# Patient Record
Sex: FEMALE | Race: WHITE | Employment: UNEMPLOYED | URBAN - METROPOLITAN AREA
[De-identification: names, ages, dates, MRNs, and addresses within clinical notes are randomized per-mention and may not be internally consistent; named-entity substitution may affect disease eponyms.]

---

## 2017-02-03 ENCOUNTER — LAB ENCOUNTER (OUTPATIENT)
Dept: LAB | Age: 52
End: 2017-02-03
Attending: OBSTETRICS & GYNECOLOGY
Payer: COMMERCIAL

## 2017-02-03 DIAGNOSIS — Z01.419 WELL WOMAN EXAM: ICD-10-CM

## 2017-02-03 PROCEDURE — 88175 CYTOPATH C/V AUTO FLUID REDO: CPT

## 2017-02-03 PROCEDURE — 87624 HPV HI-RISK TYP POOLED RSLT: CPT

## 2017-02-06 ENCOUNTER — HOSPITAL ENCOUNTER (OUTPATIENT)
Dept: MAMMOGRAPHY | Age: 52
Discharge: HOME | End: 2017-02-06
Attending: OBSTETRICS & GYNECOLOGY
Payer: COMMERCIAL

## 2017-02-06 DIAGNOSIS — Z12.31 VISIT FOR SCREENING MAMMOGRAM: ICD-10-CM

## 2017-02-06 LAB — HPV I/H RISK 1 DNA SPEC QL NAA+PROBE: NEGATIVE

## 2017-02-06 PROCEDURE — 77067 SCR MAMMO BI INCL CAD: CPT

## 2017-07-20 PROBLEM — H69.82 EUSTACHIAN TUBE DYSFUNCTION, LEFT: Status: ACTIVE | Noted: 2017-07-20

## 2017-07-20 PROBLEM — R09.82 POST-NASAL DRAINAGE: Status: ACTIVE | Noted: 2017-07-20

## 2017-07-20 PROBLEM — H69.92 EUSTACHIAN TUBE DYSFUNCTION, LEFT: Status: ACTIVE | Noted: 2017-07-20

## 2018-01-24 ENCOUNTER — OFFICE VISIT (OUTPATIENT)
Dept: OBGYN CLINIC | Facility: CLINIC | Age: 53
End: 2018-01-24

## 2018-01-24 ENCOUNTER — TELEPHONE (OUTPATIENT)
Dept: FAMILY MEDICINE CLINIC | Facility: CLINIC | Age: 53
End: 2018-01-24

## 2018-01-24 VITALS
SYSTOLIC BLOOD PRESSURE: 120 MMHG | HEART RATE: 72 BPM | HEIGHT: 61 IN | DIASTOLIC BLOOD PRESSURE: 66 MMHG | WEIGHT: 136 LBS | BODY MASS INDEX: 25.68 KG/M2

## 2018-01-24 DIAGNOSIS — Z01.419 WELL WOMAN EXAM WITH ROUTINE GYNECOLOGICAL EXAM: Primary | ICD-10-CM

## 2018-01-24 DIAGNOSIS — Z12.39 SCREENING BREAST EXAMINATION: ICD-10-CM

## 2018-01-24 PROCEDURE — 99396 PREV VISIT EST AGE 40-64: CPT | Performed by: OBSTETRICS & GYNECOLOGY

## 2018-01-24 RX ORDER — ESTRADIOL 1 MG/1
1 TABLET ORAL DAILY
Qty: 90 TABLET | Refills: 4 | Status: SHIPPED | OUTPATIENT
Start: 2018-01-24 | End: 2019-01-31

## 2018-01-24 NOTE — PROGRESS NOTES
Jeanna Sandoval is a 45year old female  No LMP recorded. Patient is not currently having periods (Reason: Menopause). Patient presents with:  Wellness Visit  . She has no complaints. Denies postmenopausal bleeding, urinary or sexual issues.   Compla or equivalent per week    Drug use: No    Sexual activity: Not on file     Other Topics Concern    Caffeine Concern Yes    Comment: TEA    Exercise Yes    Comment: active    Seat Belt Yes    Special Diet No    Stress Concern No    Weight Concern No     Soc bruises  Extremities: no edema, no cyanosis  Psychiatric:  Oriented to time, place, person and situation.  Appropriate mood and affect    Pelvic Exam:  External Genitalia: normal appearance, hair distribution, and no lesions  Urethral Meatus:  normal in siz

## 2018-01-25 ENCOUNTER — OFFICE VISIT (OUTPATIENT)
Dept: FAMILY MEDICINE CLINIC | Facility: CLINIC | Age: 53
End: 2018-01-25

## 2018-01-25 ENCOUNTER — LAB ENCOUNTER (OUTPATIENT)
Dept: LAB | Age: 53
End: 2018-01-25
Attending: FAMILY MEDICINE
Payer: COMMERCIAL

## 2018-01-25 VITALS
SYSTOLIC BLOOD PRESSURE: 106 MMHG | RESPIRATION RATE: 16 BRPM | HEIGHT: 60.8 IN | TEMPERATURE: 98 F | DIASTOLIC BLOOD PRESSURE: 74 MMHG | WEIGHT: 133.38 LBS | BODY MASS INDEX: 25.51 KG/M2 | HEART RATE: 76 BPM

## 2018-01-25 DIAGNOSIS — Z13.220 LIPID SCREENING: ICD-10-CM

## 2018-01-25 DIAGNOSIS — M79.89 FOOT SWELLING: ICD-10-CM

## 2018-01-25 DIAGNOSIS — R53.83 OTHER FATIGUE: ICD-10-CM

## 2018-01-25 DIAGNOSIS — M25.549 PAIN IN MULTIPLE FINGER JOINTS: ICD-10-CM

## 2018-01-25 DIAGNOSIS — Z00.00 ANNUAL PHYSICAL EXAM: ICD-10-CM

## 2018-01-25 DIAGNOSIS — Z83.49 FAMILY HISTORY OF THYROID DISEASE: ICD-10-CM

## 2018-01-25 DIAGNOSIS — Z00.00 ANNUAL PHYSICAL EXAM: Primary | ICD-10-CM

## 2018-01-25 PROBLEM — H69.82 EUSTACHIAN TUBE DYSFUNCTION, LEFT: Status: RESOLVED | Noted: 2017-07-20 | Resolved: 2018-01-25

## 2018-01-25 PROBLEM — R09.82 POST-NASAL DRAINAGE: Status: RESOLVED | Noted: 2017-07-20 | Resolved: 2018-01-25

## 2018-01-25 PROBLEM — H69.92 EUSTACHIAN TUBE DYSFUNCTION, LEFT: Status: RESOLVED | Noted: 2017-07-20 | Resolved: 2018-01-25

## 2018-01-25 LAB
ALBUMIN SERPL-MCNC: 4.2 G/DL (ref 3.5–4.8)
ALP LIVER SERPL-CCNC: 84 U/L (ref 41–108)
ALT SERPL-CCNC: 26 U/L (ref 14–54)
AST SERPL-CCNC: 21 U/L (ref 15–41)
BASOPHILS # BLD AUTO: 0.03 X10(3) UL (ref 0–0.1)
BASOPHILS NFR BLD AUTO: 0.6 %
BILIRUB SERPL-MCNC: 0.4 MG/DL (ref 0.1–2)
BUN BLD-MCNC: 14 MG/DL (ref 8–20)
CALCIUM BLD-MCNC: 9.3 MG/DL (ref 8.3–10.3)
CHLORIDE: 104 MMOL/L (ref 101–111)
CHOLEST SMN-MCNC: 230 MG/DL (ref ?–200)
CO2: 30 MMOL/L (ref 22–32)
CREAT BLD-MCNC: 0.84 MG/DL (ref 0.55–1.02)
EOSINOPHIL # BLD AUTO: 0.12 X10(3) UL (ref 0–0.3)
EOSINOPHIL NFR BLD AUTO: 2.5 %
ERYTHROCYTE [DISTWIDTH] IN BLOOD BY AUTOMATED COUNT: 13.5 % (ref 11.5–16)
GLUCOSE BLD-MCNC: 79 MG/DL (ref 70–99)
HCT VFR BLD AUTO: 41.1 % (ref 34–50)
HDLC SERPL-MCNC: 127 MG/DL (ref 45–?)
HDLC SERPL: 1.81 {RATIO} (ref ?–4.44)
HGB BLD-MCNC: 13.3 G/DL (ref 12–16)
IMMATURE GRANULOCYTE COUNT: 0.01 X10(3) UL (ref 0–1)
IMMATURE GRANULOCYTE RATIO %: 0.2 %
LDLC SERPL CALC-MCNC: 91 MG/DL (ref ?–130)
LYMPHOCYTES # BLD AUTO: 0.84 X10(3) UL (ref 0.9–4)
LYMPHOCYTES NFR BLD AUTO: 17.8 %
M PROTEIN MFR SERPL ELPH: 8.2 G/DL (ref 6.1–8.3)
MCH RBC QN AUTO: 30.3 PG (ref 27–33.2)
MCHC RBC AUTO-ENTMCNC: 32.4 G/DL (ref 31–37)
MCV RBC AUTO: 93.6 FL (ref 81–100)
MONOCYTES # BLD AUTO: 0.59 X10(3) UL (ref 0.1–0.6)
MONOCYTES NFR BLD AUTO: 12.5 %
NEUTROPHIL ABS PRELIM: 3.14 X10 (3) UL (ref 1.3–6.7)
NEUTROPHILS # BLD AUTO: 3.14 X10(3) UL (ref 1.3–6.7)
NEUTROPHILS NFR BLD AUTO: 66.4 %
NONHDLC SERPL-MCNC: 103 MG/DL (ref ?–130)
PLATELET # BLD AUTO: 316 10(3)UL (ref 150–450)
POTASSIUM SERPL-SCNC: 4.3 MMOL/L (ref 3.6–5.1)
RBC # BLD AUTO: 4.39 X10(6)UL (ref 3.8–5.1)
RED CELL DISTRIBUTION WIDTH-SD: 46.1 FL (ref 35.1–46.3)
SODIUM SERPL-SCNC: 140 MMOL/L (ref 136–144)
TRIGL SERPL-MCNC: 62 MG/DL (ref ?–150)
TSI SER-ACNC: 2.7 MIU/ML (ref 0.35–5.5)
VLDLC SERPL CALC-MCNC: 12 MG/DL (ref 5–40)
WBC # BLD AUTO: 4.7 X10(3) UL (ref 4–13)

## 2018-01-25 PROCEDURE — 86200 CCP ANTIBODY: CPT | Performed by: FAMILY MEDICINE

## 2018-01-25 PROCEDURE — 80050 GENERAL HEALTH PANEL: CPT | Performed by: FAMILY MEDICINE

## 2018-01-25 PROCEDURE — 86038 ANTINUCLEAR ANTIBODIES: CPT | Performed by: FAMILY MEDICINE

## 2018-01-25 PROCEDURE — 99386 PREV VISIT NEW AGE 40-64: CPT | Performed by: FAMILY MEDICINE

## 2018-01-25 PROCEDURE — 80061 LIPID PANEL: CPT | Performed by: FAMILY MEDICINE

## 2018-01-25 RX ORDER — TURMERIC ROOT EXTRACT 500 MG
1 TABLET ORAL DAILY
COMMUNITY

## 2018-01-25 RX ORDER — OCTISALATE, AVOBENZONE, HOMOSALATE, AND OCTOCRYLENE 29.4; 29.4; 49; 25.48 MG/ML; MG/ML; MG/ML; MG/ML
1 LOTION TOPICAL DAILY
COMMUNITY
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
## 2018-01-26 LAB — ANA SCREEN: NEGATIVE

## 2018-01-27 LAB — CYCLIC CITRULLINATED PEPTIDE: 4 UNITS

## 2018-01-31 PROBLEM — M84.375A STRESS REACTION OF LEFT FOOT, INITIAL ENCOUNTER: Status: ACTIVE | Noted: 2018-01-31

## 2018-02-08 ENCOUNTER — HOSPITAL ENCOUNTER (OUTPATIENT)
Dept: MAMMOGRAPHY | Age: 53
Discharge: HOME | End: 2018-02-08
Attending: OBSTETRICS & GYNECOLOGY
Payer: COMMERCIAL

## 2018-02-08 DIAGNOSIS — Z12.39 SCREENING BREAST EXAMINATION: ICD-10-CM

## 2018-02-08 PROCEDURE — 77067 SCR MAMMO BI INCL CAD: CPT | Performed by: OBSTETRICS & GYNECOLOGY

## 2018-04-24 ENCOUNTER — OFFICE VISIT (OUTPATIENT)
Dept: FAMILY MEDICINE CLINIC | Facility: CLINIC | Age: 53
End: 2018-04-24

## 2018-04-24 VITALS
BODY MASS INDEX: 24.87 KG/M2 | OXYGEN SATURATION: 99 % | TEMPERATURE: 98 F | HEIGHT: 60.5 IN | WEIGHT: 130 LBS | DIASTOLIC BLOOD PRESSURE: 60 MMHG | SYSTOLIC BLOOD PRESSURE: 102 MMHG | HEART RATE: 80 BPM | RESPIRATION RATE: 16 BRPM

## 2018-04-24 DIAGNOSIS — R05.3 CHRONIC COUGH: Primary | ICD-10-CM

## 2018-04-24 PROCEDURE — 99213 OFFICE O/P EST LOW 20 MIN: CPT | Performed by: FAMILY MEDICINE

## 2018-04-24 RX ORDER — ALBUTEROL SULFATE 90 UG/1
2 AEROSOL, METERED RESPIRATORY (INHALATION) EVERY 6 HOURS PRN
Qty: 1 INHALER | Refills: 0 | Status: SHIPPED | OUTPATIENT
Start: 2018-04-24 | End: 2021-03-24

## 2018-04-24 RX ORDER — AZITHROMYCIN 250 MG/1
TABLET, FILM COATED ORAL
Qty: 6 TABLET | Refills: 0 | Status: SHIPPED | OUTPATIENT
Start: 2018-04-24 | End: 2018-08-14 | Stop reason: ALTCHOICE

## 2018-04-24 NOTE — PROGRESS NOTES
Chief Complaint:  Patient presents with:  Cough: x5 weeks w/ productive cough, PND and fatigue. OTC meds w/ little relief. HPI:  This is a 45year old female patient presenting for Cough (x5 weeks w/ productive cough, PND and fatigue.  OTC meds w/ littl Disorder Mother      knee replacement   • Heart Disorder Mother      pacemaker   • Diabetes Paternal Grandmother    • No Known Problems Brother    • No Known Problems Brother    • No Known Problems Brother       Smoking status: Never Smoker movement  CV: HRRR, no murmurs, no peripheral edema   EXTREMITIES: warm and well perfused  PSYCH: pleasant and cooperative, no obvious depression or anxiety    ASSESSMENT AND PLAN:  Discussed the following assessment   Problem List Items Addressed This Freddie Bennett

## 2018-11-19 PROBLEM — R29.810 FACIAL WEAKNESS: Status: ACTIVE | Noted: 2018-11-19

## 2018-11-19 PROBLEM — G51.4 FACIAL TWITCHING: Status: ACTIVE | Noted: 2018-11-19

## 2018-12-13 PROCEDURE — 86618 LYME DISEASE ANTIBODY: CPT | Performed by: OTHER

## 2018-12-13 PROCEDURE — 84425 ASSAY OF VITAMIN B-1: CPT | Performed by: OTHER

## 2018-12-13 PROCEDURE — 83921 ORGANIC ACID SINGLE QUANT: CPT | Performed by: OTHER

## 2019-01-31 RX ORDER — ESTRADIOL 1 MG/1
TABLET ORAL
Qty: 30 TABLET | Refills: 0 | Status: SHIPPED | OUTPATIENT
Start: 2019-01-31 | End: 2019-02-19

## 2019-02-19 ENCOUNTER — OFFICE VISIT (OUTPATIENT)
Dept: OBGYN CLINIC | Facility: CLINIC | Age: 54
End: 2019-02-19
Payer: COMMERCIAL

## 2019-02-19 VITALS
HEART RATE: 55 BPM | WEIGHT: 128 LBS | DIASTOLIC BLOOD PRESSURE: 60 MMHG | SYSTOLIC BLOOD PRESSURE: 110 MMHG | BODY MASS INDEX: 23.55 KG/M2 | HEIGHT: 62 IN

## 2019-02-19 DIAGNOSIS — Z12.39 SCREENING FOR MALIGNANT NEOPLASM OF BREAST: ICD-10-CM

## 2019-02-19 DIAGNOSIS — Z12.4 PAPANICOLAOU SMEAR FOR CERVICAL CANCER SCREENING: ICD-10-CM

## 2019-02-19 DIAGNOSIS — Z01.419 WELL WOMAN EXAM WITH ROUTINE GYNECOLOGICAL EXAM: Primary | ICD-10-CM

## 2019-02-19 PROCEDURE — 88175 CYTOPATH C/V AUTO FLUID REDO: CPT | Performed by: OBSTETRICS & GYNECOLOGY

## 2019-02-19 PROCEDURE — 99396 PREV VISIT EST AGE 40-64: CPT | Performed by: OBSTETRICS & GYNECOLOGY

## 2019-02-19 PROCEDURE — 87624 HPV HI-RISK TYP POOLED RSLT: CPT | Performed by: OBSTETRICS & GYNECOLOGY

## 2019-02-19 RX ORDER — ESTRADIOL 1 MG/1
1 TABLET ORAL
Qty: 30 TABLET | Refills: 0 | Status: SHIPPED | OUTPATIENT
Start: 2019-02-19 | End: 2019-04-16

## 2019-02-19 NOTE — PROGRESS NOTES
Neil Márquez is a 52year old female  No LMP recorded. Patient is not currently having periods (Reason: Menopause). No chief complaint on file. Baueral Claude Has been on hormone replacement for a year. No hot flashes or night sweats or mood swings.   She Never Smoker      Smokeless tobacco: Never Used    Substance and Sexual Activity      Alcohol use: Yes        Types: 2 Glasses of wine, 2 Standard drinks or equivalent per week      Drug use: No      Sexual activity: Not on file    Lifestyle      Physical estradiol 1 MG Oral Tab, Take 1 tablet (1 mg total) by mouth once daily. , Disp: 30 tablet, Rfl: 0  •  ergocalciferol 64703 units Oral Cap, Take one 2x per week, Disp: 24 capsule, Rfl: 0  •  levofloxacin 500 MG Oral Tab, Take 1 tablet (500 mg total) by mout Extremities: no edema, no cyanosis  Psychiatric:  Oriented to time, place, person and situation.  Appropriate mood and affect    Pelvic Exam:  External Genitalia: normal appearance, hair distribution, and no lesions  Urethral Meatus:  normal in size, loca

## 2019-02-20 LAB — HPV I/H RISK 1 DNA SPEC QL NAA+PROBE: NEGATIVE

## 2019-02-21 LAB — LAST PAP RESULT: NORMAL

## 2019-02-27 RX ORDER — ESTRADIOL 1 MG/1
TABLET ORAL
Qty: 30 TABLET | Refills: 11 | Status: SHIPPED | OUTPATIENT
Start: 2019-02-27 | End: 2020-03-06

## 2019-03-21 ENCOUNTER — HOSPITAL ENCOUNTER (OUTPATIENT)
Dept: MAMMOGRAPHY | Age: 54
Discharge: HOME | End: 2019-03-21
Payer: COMMERCIAL

## 2019-03-21 DIAGNOSIS — Z12.39 SCREENING FOR MALIGNANT NEOPLASM OF BREAST: ICD-10-CM

## 2019-03-21 PROCEDURE — 77067 SCR MAMMO BI INCL CAD: CPT | Performed by: OBSTETRICS & GYNECOLOGY

## 2019-03-21 PROCEDURE — 77063 BREAST TOMOSYNTHESIS BI: CPT | Performed by: OBSTETRICS & GYNECOLOGY

## 2019-04-03 ENCOUNTER — HOSPITAL ENCOUNTER (OUTPATIENT)
Dept: MAMMOGRAPHY | Facility: HOSPITAL | Age: 54
Discharge: HOME | End: 2019-04-03
Attending: OBSTETRICS & GYNECOLOGY
Payer: COMMERCIAL

## 2019-04-03 DIAGNOSIS — R92.2 INCONCLUSIVE MAMMOGRAM: ICD-10-CM

## 2019-04-03 PROCEDURE — 77065 DX MAMMO INCL CAD UNI: CPT | Performed by: OBSTETRICS & GYNECOLOGY

## 2019-04-03 PROCEDURE — 76642 ULTRASOUND BREAST LIMITED: CPT | Performed by: OBSTETRICS & GYNECOLOGY

## 2019-04-03 PROCEDURE — 77061 BREAST TOMOSYNTHESIS UNI: CPT | Performed by: OBSTETRICS & GYNECOLOGY

## 2019-04-16 ENCOUNTER — OFFICE VISIT (OUTPATIENT)
Dept: FAMILY MEDICINE CLINIC | Facility: CLINIC | Age: 54
End: 2019-04-16
Payer: COMMERCIAL

## 2019-04-16 VITALS
HEIGHT: 62 IN | BODY MASS INDEX: 23.37 KG/M2 | OXYGEN SATURATION: 98 % | SYSTOLIC BLOOD PRESSURE: 110 MMHG | HEART RATE: 76 BPM | DIASTOLIC BLOOD PRESSURE: 68 MMHG | RESPIRATION RATE: 16 BRPM | TEMPERATURE: 98 F | WEIGHT: 127 LBS

## 2019-04-16 DIAGNOSIS — S06.0X0A CONCUSSION WITHOUT LOSS OF CONSCIOUSNESS, INITIAL ENCOUNTER: Primary | ICD-10-CM

## 2019-04-16 PROCEDURE — 99214 OFFICE O/P EST MOD 30 MIN: CPT | Performed by: PHYSICIAN ASSISTANT

## 2019-04-16 RX ORDER — MECLIZINE HYDROCHLORIDE 25 MG/1
25 TABLET ORAL 3 TIMES DAILY PRN
Qty: 20 TABLET | Refills: 0 | Status: SHIPPED | OUTPATIENT
Start: 2019-04-16 | End: 2021-03-24

## 2019-04-16 RX ORDER — CYCLOBENZAPRINE HCL 5 MG
5 TABLET ORAL 3 TIMES DAILY PRN
Qty: 20 TABLET | Refills: 0 | Status: SHIPPED | OUTPATIENT
Start: 2019-04-16 | End: 2019-08-02
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
## 2019-04-24 ENCOUNTER — OFFICE VISIT (OUTPATIENT)
Dept: FAMILY MEDICINE CLINIC | Facility: CLINIC | Age: 54
End: 2019-04-24
Payer: COMMERCIAL

## 2019-04-24 VITALS
HEIGHT: 62 IN | WEIGHT: 129 LBS | SYSTOLIC BLOOD PRESSURE: 132 MMHG | BODY MASS INDEX: 23.74 KG/M2 | TEMPERATURE: 98 F | RESPIRATION RATE: 16 BRPM | HEART RATE: 84 BPM | DIASTOLIC BLOOD PRESSURE: 82 MMHG
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
## 2019-04-24 DIAGNOSIS — S06.0X0D CONCUSSION WITHOUT LOSS OF CONSCIOUSNESS, SUBSEQUENT ENCOUNTER: ICD-10-CM

## 2019-04-24 PROCEDURE — 99213 OFFICE O/P EST LOW 20 MIN: CPT | Performed by: PHYSICIAN ASSISTANT
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
## 2019-04-24 NOTE — PATIENT INSTRUCTIONS
You can try cyclobenzaprine two tablets (10 mg total) at night to see if this helps with sleep. If this does not work, we may want to consider amitriptyline low dose.  This is a tricyclic antidepressant that I will frequently use after concussions for hea

## 2019-08-02 ENCOUNTER — OFFICE VISIT (OUTPATIENT)
Dept: OBGYN CLINIC | Facility: CLINIC | Age: 54
End: 2019-08-02
Payer: COMMERCIAL

## 2019-08-02 VITALS
HEIGHT: 62 IN | BODY MASS INDEX: 23.74 KG/M2 | DIASTOLIC BLOOD PRESSURE: 70 MMHG | SYSTOLIC BLOOD PRESSURE: 118 MMHG | HEART RATE: 85 BPM | WEIGHT: 129 LBS

## 2019-08-02 DIAGNOSIS — N95.0 POSTMENOPAUSAL BLEEDING: Primary | ICD-10-CM

## 2019-08-02 PROCEDURE — 88305 TISSUE EXAM BY PATHOLOGIST: CPT | Performed by: OBSTETRICS & GYNECOLOGY

## 2019-08-02 PROCEDURE — 58100 BIOPSY OF UTERUS LINING: CPT | Performed by: OBSTETRICS & GYNECOLOGY

## 2019-08-02 NOTE — PROGRESS NOTES
Endometrial Biopsy     Pre-Procedure Care:   Consent was obtained. Procedure/risks were explained. Questions were answered. Correct patient was identified. Correct side and site were confirmed.     Birth control method(s) used:   Menopause    Indication

## 2019-08-06 ENCOUNTER — OFFICE VISIT (OUTPATIENT)
Dept: FAMILY MEDICINE CLINIC | Facility: CLINIC | Age: 54
End: 2019-08-06
Payer: COMMERCIAL

## 2019-08-06 VITALS
SYSTOLIC BLOOD PRESSURE: 116 MMHG | TEMPERATURE: 99 F | WEIGHT: 129 LBS | RESPIRATION RATE: 16 BRPM | HEART RATE: 76 BPM | DIASTOLIC BLOOD PRESSURE: 78 MMHG | HEIGHT: 60.5 IN | BODY MASS INDEX: 24.67 KG/M2

## 2019-08-06 DIAGNOSIS — R21 RASH: ICD-10-CM

## 2019-08-06 DIAGNOSIS — M54.41 ACUTE RIGHT-SIDED LOW BACK PAIN WITH RIGHT-SIDED SCIATICA: Primary | ICD-10-CM

## 2019-08-06 DIAGNOSIS — G89.29 CHRONIC RIGHT SHOULDER PAIN: ICD-10-CM

## 2019-08-06 DIAGNOSIS — M25.511 CHRONIC RIGHT SHOULDER PAIN: ICD-10-CM

## 2019-08-06 PROCEDURE — 99214 OFFICE O/P EST MOD 30 MIN: CPT | Performed by: NURSE PRACTITIONER

## 2019-08-06 RX ORDER — METHYLPREDNISOLONE 4 MG/1
TABLET ORAL
Qty: 1 KIT | Refills: 0 | Status: SHIPPED | OUTPATIENT
Start: 2019-08-06 | End: 2020-01-09 | Stop reason: ALTCHOICE

## 2019-08-06 RX ORDER — VALACYCLOVIR HYDROCHLORIDE 1 G/1
1 TABLET, FILM COATED ORAL 3 TIMES DAILY
Qty: 21 TABLET | Refills: 0 | Status: SHIPPED | OUTPATIENT
Start: 2019-08-06 | End: 2019-08-13

## 2019-08-06 RX ORDER — CYCLOBENZAPRINE HCL 5 MG
5 TABLET ORAL NIGHTLY PRN
Qty: 10 TABLET | Refills: 0 | Status: SHIPPED | OUTPATIENT
Start: 2019-08-06 | End: 2021-12-17

## 2019-08-06 NOTE — PATIENT INSTRUCTIONS
Medrol dose pack prescribed today. Instructions for taking are located on packaging. For today, the first day, Take all 6 tablets at one time.      Starting tomorrow take tablets as indicated in packaging material.     Do not t

## 2019-08-06 NOTE — PROGRESS NOTES
Patient presents with:  Back Pain: Right low back pain, started 3 days ago. Pt has iced, ibuprofen w/ no relief      HPI:  Presents with 3 day history of right lower back pain that started sharply after she stood up from kneeling position on floor.  Stated Therapy Pack As directed. Disp: 1 kit Rfl: 0   Cyclobenzaprine HCl 5 MG Oral Tab Take 1 tablet (5 mg total) by mouth nightly as needed for Muscle spasms.  Disp: 10 tablet Rfl: 0   Progesterone Micronized 200 MG Oral Cap  Disp:  Rfl: 4   ESTRADIOL 1 MG Oral dry. No pallor. Right posterior shoulder to anterior shoulder with several patches of erythema with clear vesicles following dermatome C5. No active drainage.        A/P:    Acute right-sided low back pain with right-sided sciatica  (primary encounter diagn day, Take all 6 tablets at one time. Starting tomorrow take tablets as indicated in packaging material.     Do not take Advil, Motril, ibuprofen products or Aleve while taking this medication.      It is ok to take acetaminophen (Tylenol) while taking t

## 2019-08-08 NOTE — PROGRESS NOTES
Patient aware of normal pathology results. Patient states that she has been bleeding for 2 weeks and wants to know if Dr. Anita Escobar has any other recommendations. Will check with Dr. Anita Escobar and call patient back.

## 2019-08-09 DIAGNOSIS — N95.0 POSTMENOPAUSAL BLEEDING: Primary | ICD-10-CM

## 2019-08-09 NOTE — PROGRESS NOTES
Per Dr. Scruggs Like recommendations order for pelvic US placed in Epic. Patient aware and verbalizes understanding.

## 2019-08-17 ENCOUNTER — HOSPITAL ENCOUNTER (OUTPATIENT)
Dept: ULTRASOUND IMAGING | Age: 54
Discharge: HOME | End: 2019-08-17
Attending: OBSTETRICS & GYNECOLOGY
Payer: COMMERCIAL

## 2019-08-17 DIAGNOSIS — N95.0 POSTMENOPAUSAL BLEEDING: ICD-10-CM

## 2019-08-17 PROCEDURE — 76830 TRANSVAGINAL US NON-OB: CPT | Performed by: OBSTETRICS & GYNECOLOGY

## 2019-08-17 PROCEDURE — 76856 US EXAM PELVIC COMPLETE: CPT | Performed by: OBSTETRICS & GYNECOLOGY

## 2019-08-21 ENCOUNTER — TELEPHONE (OUTPATIENT)
Dept: OBGYN CLINIC | Facility: CLINIC | Age: 54
End: 2019-08-21

## 2019-08-21 NOTE — PROGRESS NOTES
Patient aware of normal results. Patient realized that she has been taking progesterone every other day but it is prescribed to be taken daily for 12 days. Will continue to take her progesterone as prescribed and see if her bleeding resolves.

## 2019-10-01 ENCOUNTER — OFFICE VISIT (OUTPATIENT)
Dept: PODIATRY CLINIC | Facility: CLINIC | Age: 54
End: 2019-10-01
Payer: COMMERCIAL

## 2019-10-01 VITALS — DIASTOLIC BLOOD PRESSURE: 65 MMHG | HEART RATE: 83 BPM | SYSTOLIC BLOOD PRESSURE: 117 MMHG

## 2019-10-01 DIAGNOSIS — M79.672 LEFT FOOT PAIN: Primary | ICD-10-CM

## 2019-10-01 DIAGNOSIS — M79.671 RIGHT FOOT PAIN: ICD-10-CM

## 2019-10-01 DIAGNOSIS — M20.5X2 HALLUX LIMITUS OF LEFT FOOT: ICD-10-CM

## 2019-10-01 DIAGNOSIS — M67.40 MUCOID CYST OF JOINT: ICD-10-CM

## 2019-10-01 PROCEDURE — 20612 ASPIRATE/INJ GANGLION CYST: CPT | Performed by: PODIATRIST

## 2019-10-01 PROCEDURE — 99202 OFFICE O/P NEW SF 15 MIN: CPT | Performed by: PODIATRIST

## 2019-10-01 PROCEDURE — 73620 X-RAY EXAM OF FOOT: CPT | Performed by: PODIATRIST

## 2019-10-02 NOTE — PROGRESS NOTES
Radha Hsieh is a 62year old female. Patient presents with:  New Patient: last seen 5/18 - bump on right hallux - its been there for a couple months - not to painful but very annoying.         HPI:   Patient presents today states she has this cyst/bump Date   • CREATE EARDRUM OPENING,GEN ANESTH  8-10 yrs    left ear, replaced 2x   • HERNAN BIOPSY STEREO NODULE 1 SITE RIGHT (QDY=36621)  1/2012    benign   • OTHER SURGICAL HISTORY  12/30/14    cervical dissectomy C5-C6   • OTHER SURGICAL HISTORY  2014    rt t rashes  RESPIRATORY: denies shortness of breath with exertion  CARDIOVASCULAR: denies chest pain on exertion  GI: denies abdominal pain and denies heartburn  NEURO: denies headaches  MUSCULO: denies arthritis, back pain      EXAM:   /65 (BP Location:

## 2020-01-15 ENCOUNTER — OFFICE VISIT (OUTPATIENT)
Dept: FAMILY MEDICINE CLINIC | Facility: CLINIC | Age: 55
End: 2020-01-15
Payer: COMMERCIAL

## 2020-01-15 VITALS
RESPIRATION RATE: 16 BRPM | DIASTOLIC BLOOD PRESSURE: 84 MMHG | TEMPERATURE: 98 F | WEIGHT: 132 LBS | HEIGHT: 62 IN | HEART RATE: 72 BPM | SYSTOLIC BLOOD PRESSURE: 124 MMHG | BODY MASS INDEX: 24.29 KG/M2

## 2020-01-15 DIAGNOSIS — J02.9 SORE THROAT: ICD-10-CM

## 2020-01-15 DIAGNOSIS — J02.9 ACUTE PHARYNGITIS, UNSPECIFIED ETIOLOGY: Primary | ICD-10-CM

## 2020-01-15 LAB
CONTROL LINE PRESENT WITH A CLEAR BACKGROUND (YES/NO): YES YES/NO
KIT LOT #: NORMAL NUMERIC
STREP GRP A CUL-SCR: NEGATIVE

## 2020-01-15 PROCEDURE — 99213 OFFICE O/P EST LOW 20 MIN: CPT | Performed by: PHYSICIAN ASSISTANT

## 2020-01-15 PROCEDURE — 87880 STREP A ASSAY W/OPTIC: CPT | Performed by: PHYSICIAN ASSISTANT

## 2020-01-15 RX ORDER — PREDNISONE 20 MG/1
40 TABLET ORAL DAILY
Qty: 6 TABLET | Refills: 0 | Status: SHIPPED | OUTPATIENT
Start: 2020-01-15 | End: 2020-01-18

## 2020-01-16 NOTE — PROGRESS NOTES
Patient presents with:  Sore Throat: x 10days,sore throat difficulty swallowing, exposed to strep, gargling not helping       HISTORY OF PRESENT ILLNESS  Ha Monroe is a 62year old female who presents for evaluation of sore throat.  She has had this disease     Stress reaction of left foot, initial encounter     Facial twitching     Facial weakness     Postmenopausal bleeding      Past Surgical History:   Procedure Laterality Date   • Create eardrum opening,gen anesth  8-10 yrs    left ear, replaced 2 ASSESSMENT/ PLAN  (J02.9) Acute pharyngitis, unspecified etiology  (primary encounter diagnosis)  Plan: Suspect developing sinusitis. Negative rapi strep. Recommend supportive cares. Sent rx for prednisone.  Follow up if pain persists next few days wit

## 2020-03-06 RX ORDER — ESTRADIOL 1 MG/1
TABLET ORAL
Qty: 30 TABLET | Refills: 0 | Status: SHIPPED | OUTPATIENT
Start: 2020-03-06 | End: 2020-05-04

## 2020-05-05 RX ORDER — ESTRADIOL 1 MG/1
TABLET ORAL
Qty: 30 TABLET | Refills: 0 | Status: SHIPPED | OUTPATIENT
Start: 2020-05-05 | End: 2020-06-09

## 2020-06-09 ENCOUNTER — OFFICE VISIT (OUTPATIENT)
Dept: OBGYN CLINIC | Facility: CLINIC | Age: 55
End: 2020-06-09
Payer: COMMERCIAL

## 2020-06-09 VITALS
HEIGHT: 62 IN | BODY MASS INDEX: 25.58 KG/M2 | WEIGHT: 139 LBS | DIASTOLIC BLOOD PRESSURE: 72 MMHG | HEART RATE: 73 BPM | SYSTOLIC BLOOD PRESSURE: 124 MMHG

## 2020-06-09 DIAGNOSIS — Z01.419 WELL WOMAN EXAM WITH ROUTINE GYNECOLOGICAL EXAM: ICD-10-CM

## 2020-06-09 DIAGNOSIS — Z12.31 ENCOUNTER FOR SCREENING MAMMOGRAM FOR MALIGNANT NEOPLASM OF BREAST: Primary | ICD-10-CM

## 2020-06-09 PROCEDURE — 58100 BIOPSY OF UTERUS LINING: CPT | Performed by: OBSTETRICS & GYNECOLOGY

## 2020-06-09 PROCEDURE — 99396 PREV VISIT EST AGE 40-64: CPT | Performed by: OBSTETRICS & GYNECOLOGY

## 2020-06-09 PROCEDURE — 88305 TISSUE EXAM BY PATHOLOGIST: CPT | Performed by: OBSTETRICS & GYNECOLOGY

## 2020-06-09 RX ORDER — ZOLPIDEM TARTRATE 5 MG/1
5 TABLET ORAL NIGHTLY PRN
Qty: 30 TABLET | Refills: 5 | Status: SHIPPED | OUTPATIENT
Start: 2020-06-09

## 2020-06-09 RX ORDER — ESTRADIOL 1 MG/1
1 TABLET ORAL DAILY
Qty: 30 TABLET | Refills: 11 | Status: SHIPPED | OUTPATIENT
Start: 2020-06-09 | End: 2021-05-11

## 2020-06-09 NOTE — PROGRESS NOTES
Endometrial Biopsy     Pre-Procedure Care:   Consent was obtained. Procedure/risks were explained. Questions were answered. Correct patient was identified. Correct side and site were confirmed.        Birth control method(s) used:  Menopause    Indicati

## 2020-06-09 NOTE — PROGRESS NOTES
Juana Chavira is a 60year old female  No LMP recorded. (Menstrual status: Menopause). Patient presents with:  Wellness Visit  .      She is on the estrogen and progesterone no hot flashes she does wake up at 3:00 in the morning is tried [employer] wi Medical: Not on file        Non-medical: Not on file    Tobacco Use      Smoking status: Never Smoker      Smokeless tobacco: Never Used    Substance and Sexual Activity      Alcohol use: Yes        Types: 2 Glasses of wine, 2 Standard drinks or equivalent pacemaker   • Diabetes Paternal Grandmother    • No Known Problems Brother    • No Known Problems Brother    • No Known Problems Brother    • No Known Problems Daughter    • No Known Problems Daughter        MEDICATIONS:    Current Outpatient Medications: itching  Skin/Breast:  Denies any breast pain, lumps, or discharge. Neurological:  denies headaches, extremity weakness or numbness.       PHYSICAL EXAM:   Constitutional: well developed, well nourished  Head/Face: normocephalic  Neck/Thyroid: thyroid sym

## 2020-06-24 DIAGNOSIS — N95.0 POST-MENOPAUSAL BLEEDING: ICD-10-CM

## 2020-06-24 DIAGNOSIS — N92.6 IRREGULAR UTERINE BLEEDING: Primary | ICD-10-CM

## 2020-06-30 ENCOUNTER — ULTRASOUND ENCOUNTER (OUTPATIENT)
Dept: OBGYN CLINIC | Facility: CLINIC | Age: 55
End: 2020-06-30
Payer: COMMERCIAL

## 2020-08-20 ENCOUNTER — TELEPHONE (OUTPATIENT)
Dept: FAMILY MEDICINE CLINIC | Facility: CLINIC | Age: 55
End: 2020-08-20

## 2020-08-20 NOTE — TELEPHONE ENCOUNTER
Had twinge in left neck moved to left shoulder blade radiating down arm had been using heat switched to ice for this has tried her baclofen and it did not help.  With the current pain and radiation of pain I suggested she go to the ED to be seen for this as

## 2020-08-20 NOTE — TELEPHONE ENCOUNTER
Pt calling she woke up Tuesday morning with a slight twinge in her back and then by that evening she she couldn't move the spasms are so bad and the pain is going down her left arm and she has taken some muscle relaxers that she had and they are not workin

## 2020-08-21 ENCOUNTER — TELEPHONE (OUTPATIENT)
Dept: FAMILY MEDICINE CLINIC | Facility: CLINIC | Age: 55
End: 2020-08-21

## 2020-08-21 NOTE — TELEPHONE ENCOUNTER
Spoke to pt who is requesting to speak to a nurse due to left shoulder blade pain. Pt really wants an appointment.

## 2020-09-12 ENCOUNTER — HOSPITAL ENCOUNTER (OUTPATIENT)
Dept: MAMMOGRAPHY | Facility: HOSPITAL | Age: 55
Discharge: HOME | End: 2020-09-12
Attending: OBSTETRICS & GYNECOLOGY
Payer: COMMERCIAL

## 2020-09-12 DIAGNOSIS — Z12.31 ENCOUNTER FOR SCREENING MAMMOGRAM FOR MALIGNANT NEOPLASM OF BREAST: ICD-10-CM

## 2020-09-12 PROCEDURE — 77063 BREAST TOMOSYNTHESIS BI: CPT | Performed by: OBSTETRICS & GYNECOLOGY

## 2020-09-12 PROCEDURE — 77067 SCR MAMMO BI INCL CAD: CPT | Performed by: OBSTETRICS & GYNECOLOGY

## 2020-09-17 ENCOUNTER — PATIENT MESSAGE (OUTPATIENT)
Dept: OBGYN CLINIC | Facility: CLINIC | Age: 55
End: 2020-09-17

## 2020-09-20 NOTE — TELEPHONE ENCOUNTER
From: Bora Harris  To: Ciro Hall MD  Sent: 9/17/2020 12:34 PM CDT  Subject: Non-Urgent Medical Question    Hello,    Good to know mammogram was ok.  I have been experiencing sore breasts for approximately two weeks, prior to mammogram test. I have

## 2021-03-24 ENCOUNTER — OFFICE VISIT (OUTPATIENT)
Dept: FAMILY MEDICINE CLINIC | Facility: CLINIC | Age: 56
End: 2021-03-24
Payer: COMMERCIAL

## 2021-03-24 VITALS
HEART RATE: 80 BPM | RESPIRATION RATE: 16 BRPM | WEIGHT: 143 LBS | SYSTOLIC BLOOD PRESSURE: 106 MMHG | DIASTOLIC BLOOD PRESSURE: 68 MMHG | BODY MASS INDEX: 26.31 KG/M2 | HEIGHT: 62 IN | TEMPERATURE: 97 F

## 2021-03-24 DIAGNOSIS — K13.0 LESION OF LIP: Primary | ICD-10-CM

## 2021-03-24 DIAGNOSIS — K13.0 MASS OF LIP: ICD-10-CM

## 2021-03-24 PROCEDURE — 3074F SYST BP LT 130 MM HG: CPT | Performed by: NURSE PRACTITIONER

## 2021-03-24 PROCEDURE — 3078F DIAST BP <80 MM HG: CPT | Performed by: NURSE PRACTITIONER

## 2021-03-24 PROCEDURE — 3008F BODY MASS INDEX DOCD: CPT | Performed by: NURSE PRACTITIONER

## 2021-03-24 PROCEDURE — 99213 OFFICE O/P EST LOW 20 MIN: CPT | Performed by: NURSE PRACTITIONER

## 2021-03-24 RX ORDER — VALACYCLOVIR HYDROCHLORIDE 1 G/1
1 TABLET, FILM COATED ORAL EVERY 12 HOURS SCHEDULED
Qty: 14 TABLET | Refills: 0 | Status: SHIPPED | OUTPATIENT
Start: 2021-03-24 | End: 2021-03-31

## 2021-03-24 NOTE — PROGRESS NOTES
Patient presents with:  Mass: abrasion on lip and bump on the side of lip       HPI:  Presents with approx 3 week history of skin lesions to left upper lip area, between lip and nose and also firm mass inside lower lip to left of mid-line.  Stated had slip Take 1 tablet (5 mg total) by mouth nightly as needed for Muscle spasms. 10 tablet 0   • Probiotic Product (ALIGN) 4 MG Oral Cap Take 1 capsule by mouth daily. • Turmeric 500 MG Oral Tab Take 1 tablet by mouth daily.      • RaNITidine HCl (ZANTAC OR) In Prescriptions Disp Refills   • valACYclovir HCl 1 G Oral Tab 14 tablet 0     Sig: Take 1 tablet (1,000 mg total) by mouth every 12 (twelve) hours for 7 days. Imaging & Consults:  None    No follow-ups on file.   There are no Patient Instructions on fi

## 2021-03-25 ENCOUNTER — OFFICE VISIT (OUTPATIENT)
Dept: NEUROLOGY | Facility: CLINIC | Age: 56
End: 2021-03-25
Payer: COMMERCIAL

## 2021-03-25 VITALS
WEIGHT: 143 LBS | RESPIRATION RATE: 16 BRPM | DIASTOLIC BLOOD PRESSURE: 84 MMHG | SYSTOLIC BLOOD PRESSURE: 126 MMHG | HEART RATE: 88 BPM | BODY MASS INDEX: 26 KG/M2

## 2021-03-25 DIAGNOSIS — R29.810 FACIAL WEAKNESS: ICD-10-CM

## 2021-03-25 DIAGNOSIS — G44.52 NEW PERSISTENT DAILY HEADACHE: ICD-10-CM

## 2021-03-25 DIAGNOSIS — R90.82 WHITE MATTER ABNORMALITY ON MRI OF BRAIN: ICD-10-CM

## 2021-03-25 DIAGNOSIS — G51.32 HEMIFACIAL SPASM OF LEFT SIDE OF FACE: Primary | ICD-10-CM

## 2021-03-25 PROCEDURE — 3074F SYST BP LT 130 MM HG: CPT | Performed by: OTHER

## 2021-03-25 PROCEDURE — 99204 OFFICE O/P NEW MOD 45 MIN: CPT | Performed by: OTHER

## 2021-03-25 PROCEDURE — 3079F DIAST BP 80-89 MM HG: CPT | Performed by: OTHER

## 2021-03-25 RX ORDER — LEVETIRACETAM 250 MG/1
250 TABLET ORAL 2 TIMES DAILY
Qty: 60 TABLET | Refills: 0 | Status: SHIPPED | OUTPATIENT
Start: 2021-03-25 | End: 2021-04-21

## 2021-03-25 NOTE — H&P
Raj New Patient / Consult Visit    Michael Garcia is a 60year old female. Referring MD: No ref.  provider found    Patient presents with:  Neurologic Problem: hemifacial spasm      HPI:    Chirsta Jacome joint pains, bowel / bladder incontinence or mood issues.      Past Medical History:   Diagnosis Date   • H/O bone scan 09/26/2013   • H/O mammogram 02/17,12/15,09/13    benign   • Hemifacial spasm of left side of face    • IBS (irritable bowel syndrome) tablet 0   • estradiol 1 MG Oral Tab Take 1 tablet (1 mg total) by mouth daily. 30 tablet 11   • Progesterone Micronized 200 MG Oral Cap Take 1 capsule (200 mg total) by mouth nightly.  36 capsule 11   • zolpidem (AMBIEN) 5 MG Oral Tab Take 1 tablet (5 mg t with direct and consensual responses, normal accomodation   Visual acuity: Normal              Visual fields: Normal  Oculomotor/Trochlear/Abducens:    Eye Movements: EOMI without nystagmus  Trigeminal:   Facial sensation:intact to light touch bilaterally internal auditory canals are normal. The cochleae,   vestibules, and semicircular canals are normal in signal and pathology.  The roof of the left   superior semicircular canal appears dehiscent.       The labyrinthine, tympanic, and mastoid segments of the 0.06   Basophils Absolute      0.00 - 0.10 10ˆ3/µL 0.04   nRBC Absolute      0.000 - 0.012 10ˆ3/µL 0.000   Neutrophils %      % 75.9   Lymphocytes %      % 14.0   Monocytes %      % 8.4   Eosinophils %      % 1.0   Basophils %      % 0.7   nRBC/100 WBC myoclonus and this has been noted to respond at times to anti-convulsant medications. We will trial levetiracetam (Keppra) starting at 250 mg bid.   Patient was advised of potential side effects, including but not limited to, excessive sedation, rash, and/

## 2021-03-25 NOTE — PROGRESS NOTES
Pt here for second opinion for left-sided hemifacial spasms since Dec 2018. Pt has previously tried botox, dry-needling, and acupuncture.

## 2021-03-29 ENCOUNTER — HOSPITAL ENCOUNTER (OUTPATIENT)
Dept: MRI IMAGING | Age: 56
Discharge: HOME | End: 2021-03-29
Attending: Other
Payer: COMMERCIAL

## 2021-03-29 DIAGNOSIS — R29.810 FACIAL WEAKNESS: ICD-10-CM

## 2021-03-29 DIAGNOSIS — G44.52 NEW PERSISTENT DAILY HEADACHE: ICD-10-CM

## 2021-03-29 DIAGNOSIS — R90.82 WHITE MATTER ABNORMALITY ON MRI OF BRAIN: ICD-10-CM

## 2021-03-29 DIAGNOSIS — G51.32 HEMIFACIAL SPASM OF LEFT SIDE OF FACE: ICD-10-CM

## 2021-03-29 PROCEDURE — 70553 MRI BRAIN STEM W/O & W/DYE: CPT | Performed by: OTHER

## 2021-03-29 PROCEDURE — A9575 INJ GADOTERATE MEGLUMI 0.1ML: HCPCS

## 2021-04-10 ENCOUNTER — LAB ENCOUNTER (OUTPATIENT)
Dept: LAB | Facility: HOSPITAL | Age: 56
End: 2021-04-10
Attending: INTERNAL MEDICINE
Payer: COMMERCIAL

## 2021-04-10 DIAGNOSIS — Z01.818 PRE-OP TESTING: ICD-10-CM

## 2021-04-13 PROBLEM — K64.8 INTERNAL HEMORRHOIDS: Status: ACTIVE | Noted: 2021-04-13

## 2021-04-13 PROBLEM — Z12.11 SPECIAL SCREENING FOR MALIGNANT NEOPLASMS, COLON: Status: ACTIVE | Noted: 2021-04-13

## 2021-04-13 PROBLEM — K57.30 DIVERTICULOSIS OF COLON: Status: ACTIVE | Noted: 2021-04-13

## 2021-04-21 DIAGNOSIS — G51.32 HEMIFACIAL SPASM OF LEFT SIDE OF FACE: ICD-10-CM

## 2021-04-21 RX ORDER — LEVETIRACETAM 250 MG/1
TABLET ORAL
Qty: 60 TABLET | Refills: 0 | Status: SHIPPED | OUTPATIENT
Start: 2021-04-21 | End: 2021-12-17

## 2021-04-21 NOTE — TELEPHONE ENCOUNTER
Medication:Levetiracetam 250 mg    Date of last refill: 03/25/21  [place]     Last office visit: 03/25/21  Due back to clinic per last office note:  RTN in 2 months  Date next office visit scheduled:    Future Appointmen (around 5/6/2021).

## 2021-05-06 ENCOUNTER — OFFICE VISIT (OUTPATIENT)
Dept: NEUROLOGY | Facility: CLINIC | Age: 56
End: 2021-05-06
Payer: COMMERCIAL

## 2021-05-06 VITALS
HEART RATE: 74 BPM | DIASTOLIC BLOOD PRESSURE: 78 MMHG | RESPIRATION RATE: 16 BRPM | WEIGHT: 143 LBS | SYSTOLIC BLOOD PRESSURE: 124 MMHG | BODY MASS INDEX: 26.31 KG/M2 | HEIGHT: 62 IN

## 2021-05-06 DIAGNOSIS — G51.32 HEMIFACIAL SPASM OF LEFT SIDE OF FACE: ICD-10-CM

## 2021-05-06 DIAGNOSIS — G43.009 ATYPICAL MIGRAINE: Primary | ICD-10-CM

## 2021-05-06 PROCEDURE — 99214 OFFICE O/P EST MOD 30 MIN: CPT | Performed by: OTHER

## 2021-05-06 PROCEDURE — 3008F BODY MASS INDEX DOCD: CPT | Performed by: OTHER

## 2021-05-06 PROCEDURE — 3074F SYST BP LT 130 MM HG: CPT | Performed by: OTHER

## 2021-05-06 PROCEDURE — 3078F DIAST BP <80 MM HG: CPT | Performed by: OTHER

## 2021-05-06 RX ORDER — SUMATRIPTAN 50 MG/1
TABLET, FILM COATED ORAL
Qty: 9 TABLET | Refills: 0 | Status: SHIPPED | OUTPATIENT
Start: 2021-05-06 | End: 2021-12-17

## 2021-05-06 RX ORDER — TOPIRAMATE 25 MG/1
TABLET ORAL
Qty: 120 TABLET | Refills: 2 | Status: SHIPPED | OUTPATIENT
Start: 2021-05-06 | End: 2021-12-17

## 2021-05-06 NOTE — PROGRESS NOTES
[facility] Progress Note    HPI  Patient presents with:  Neurologic Problem: Hemifacial spasm of left side of face      As per my initial H&P from 3/25/2021,   \" Keisha Echeverria is a 60year old, who presents for evaluation of hem Patient since last visit has been started on Keppra currently at 250 mg bid - denies any improvement in facial spasm.   She has headaches as well - denies any nausea/vomiting with these events - occur on the left side of the head as well as the back of th • No Known Problems Daughter    • No Known Problems Daughter      Social History    Socioeconomic History      Marital status:       Spouse name: Not on file      Number of children: Not on file      Years of education: Not on file      [facility] ed MG Oral Cap, Take 1 capsule by mouth daily. , Disp: , Rfl:   •  Turmeric 500 MG Oral Tab, Take 1 tablet by mouth daily. , Disp: , Rfl:   •  RaNITidine HCl (ZANTAC OR), Inject as directed., Disp: , Rfl:     Review of Systems:  No chest pain or palpitations; o craniocervical junction is unremarkable.         Stable mild chronic microvascular ischemic changes in the cerebral white matter.       There is no acute intracranial hemorrhage or extra-axial fluid collection identified.  No significant abnormal parenchym cisterns are normal.       The pituitary gland is normal in signal and morphology, and the pituitary infundibulum is midline.       The orbits are normal. The paranasal sinuses and mastoid air cells are clear.       Impression   IMPRESSION:       1.  No acu Patient has not improved on Keppra at 250 mg bid and endorses some symptoms on the left side of the face with numbness and sensitivity which could suggest an atypical migraine variant - will trial preventive therapy with  topiramate as follows:  start at 2

## 2021-05-06 NOTE — PATIENT INSTRUCTIONS
For when symptoms of atypical migraine occur, trial sumatriptan as follows:  take sumatriptan 50 mg (Imitrex) within first 30 minutes of symptoms starting; if not improved after 2 hours, take additional dose, and then stop taking; monitor for chest pain / another pharmacy that has the requested medication available.   We will gladly send a new prescription to that pharmacy at your request.    Dheeraj hernandez protocol for controlled substances; Written prescriptions:    • EFFECTIVE April 1, 2017 PATIENTS MUST   when you arrive. Not all paperwork can be completed during your office visit. • Allow 2 weeks for FMLA, disability or other complex paperwork to be processed. • Other paperwork may require up to 5 business days for completion.   • Fill in a

## 2021-05-11 RX ORDER — ESTRADIOL 1 MG/1
1 TABLET ORAL DAILY
Qty: 30 TABLET | Refills: 0 | Status: SHIPPED | OUTPATIENT
Start: 2021-05-11 | End: 2021-07-08

## 2021-06-07 ENCOUNTER — PATIENT MESSAGE (OUTPATIENT)
Dept: FAMILY MEDICINE CLINIC | Facility: CLINIC | Age: 56
End: 2021-06-07

## 2021-06-07 NOTE — TELEPHONE ENCOUNTER
From: David Liang  To: JACE Guido  Sent: 6/7/2021 1:29 PM CDT  Subject: Other    Hello, This is a sensitive issue and appreciate doctor confidentiality. I was in your office last week with my daughter Felix Villar for anxiety issues.     Saturday

## 2021-06-07 NOTE — TELEPHONE ENCOUNTER
This message was sent concerning her daughter Marry Moeller, the message was copied and pasted in Irina's chart

## 2021-06-09 RX ORDER — ESTRADIOL 1 MG/1
1 TABLET ORAL DAILY
Qty: 30 TABLET | Refills: 0 | OUTPATIENT
Start: 2021-06-09

## 2021-06-09 NOTE — TELEPHONE ENCOUNTER
Unknown address on patients phone number \"Dk Melgar\". Did not leave message if patient calls please clarify her phone number.

## 2021-06-18 RX ORDER — ESTRADIOL 1 MG/1
1 TABLET ORAL DAILY
Qty: 30 TABLET | Refills: 0 | OUTPATIENT
Start: 2021-06-18

## 2021-07-07 ENCOUNTER — TELEPHONE (OUTPATIENT)
Dept: OBGYN CLINIC | Facility: CLINIC | Age: 56
End: 2021-07-07

## 2021-07-07 RX ORDER — ESTRADIOL 1 MG/1
1 TABLET ORAL DAILY
Qty: 30 TABLET | Refills: 0 | OUTPATIENT
Start: 2021-07-07

## 2021-07-07 NOTE — TELEPHONE ENCOUNTER
Spoke with patient and explained to her, her WWE was 6/9/2020. Prescription was denied due to policy. She will schedule an appointment and have the pharmacy send a new request. Understanding verbalized.

## 2021-07-08 RX ORDER — ESTRADIOL 1 MG/1
1 TABLET ORAL DAILY
Qty: 30 TABLET | Refills: 0 | Status: SHIPPED | OUTPATIENT
Start: 2021-07-08 | End: 2021-07-20

## 2021-07-20 ENCOUNTER — OFFICE VISIT (OUTPATIENT)
Dept: OBGYN CLINIC | Facility: CLINIC | Age: 56
End: 2021-07-20
Payer: COMMERCIAL

## 2021-07-20 VITALS
SYSTOLIC BLOOD PRESSURE: 120 MMHG | BODY MASS INDEX: 25.36 KG/M2 | HEART RATE: 85 BPM | HEIGHT: 62 IN | WEIGHT: 137.81 LBS | DIASTOLIC BLOOD PRESSURE: 74 MMHG

## 2021-07-20 DIAGNOSIS — Z12.31 ENCOUNTER FOR SCREENING MAMMOGRAM FOR BREAST CANCER: Primary | ICD-10-CM

## 2021-07-20 DIAGNOSIS — Z01.419 WELL WOMAN EXAM WITH ROUTINE GYNECOLOGICAL EXAM: ICD-10-CM

## 2021-07-20 PROCEDURE — 3008F BODY MASS INDEX DOCD: CPT | Performed by: OBSTETRICS & GYNECOLOGY

## 2021-07-20 PROCEDURE — 3078F DIAST BP <80 MM HG: CPT | Performed by: OBSTETRICS & GYNECOLOGY

## 2021-07-20 PROCEDURE — 99396 PREV VISIT EST AGE 40-64: CPT | Performed by: OBSTETRICS & GYNECOLOGY

## 2021-07-20 PROCEDURE — 3074F SYST BP LT 130 MM HG: CPT | Performed by: OBSTETRICS & GYNECOLOGY

## 2021-07-20 RX ORDER — ESTRADIOL 1 MG/1
1 TABLET ORAL DAILY
Qty: 30 TABLET | Refills: 0 | Status: SHIPPED | OUTPATIENT
Start: 2021-07-20 | End: 2021-07-20

## 2021-07-20 RX ORDER — PROGESTERONE 200 MG/1
200 CAPSULE ORAL AS DIRECTED
Qty: 90 CAPSULE | Refills: 5 | Status: SHIPPED | OUTPATIENT
Start: 2021-07-20 | End: 2021-12-17

## 2021-07-20 RX ORDER — ESTRADIOL 1 MG/1
1 TABLET ORAL DAILY
Qty: 90 TABLET | Refills: 5 | Status: SHIPPED | OUTPATIENT
Start: 2021-07-20 | End: 2021-08-03

## 2021-07-20 NOTE — PROGRESS NOTES
Rosalba Cramer is a 60year old female  No LMP recorded. (Menstrual status: Menopause). Patient presents with:  Wellness Visit  . Vaginal bleeding.   She ran out of the progesterone for 3 days and had horrible cramping when she got the prog status:       Spouse name: Not on file      Number of children: Not on file      Years of education: Not on file      Highest education level: Not on file    Occupational History      Occupation: Homemaker    Tobacco Use      Smoking status: Never S Physically Abused:       Sexually Abused:     FAMILY HISTORY:  Family History   Problem Relation Age of Onset   • Breast Cancer Maternal Grandmother 76   • Arthritis Maternal Grandmother    • Thyroid disease Father    • Heart Disorder Father         Koffimnidy Haily RaNITidine HCl (ZANTAC OR), Inject as directed.  (Patient not taking: Reported on 7/20/2021 ), Disp: , Rfl:     ALLERGIES:    Penicillins             OTHER (SEE COMMENTS)    Comment:Pt unsure      Review of Systems:  Constitutional:  Denies fatigue, night s

## 2021-08-03 RX ORDER — ESTRADIOL 1 MG/1
1 TABLET ORAL DAILY
Qty: 90 TABLET | Refills: 5 | Status: SHIPPED | OUTPATIENT
Start: 2021-08-03

## 2021-09-14 ENCOUNTER — HOSPITAL ENCOUNTER (OUTPATIENT)
Dept: MAMMOGRAPHY | Age: 56
Discharge: HOME | End: 2021-09-14
Attending: OBSTETRICS & GYNECOLOGY
Payer: COMMERCIAL

## 2021-09-14 DIAGNOSIS — Z12.31 ENCOUNTER FOR SCREENING MAMMOGRAM FOR BREAST CANCER: ICD-10-CM

## 2021-09-14 PROCEDURE — 77067 SCR MAMMO BI INCL CAD: CPT | Performed by: OBSTETRICS & GYNECOLOGY

## 2021-09-14 PROCEDURE — 77063 BREAST TOMOSYNTHESIS BI: CPT | Performed by: OBSTETRICS & GYNECOLOGY

## 2021-09-30 ENCOUNTER — HOSPITAL ENCOUNTER (OUTPATIENT)
Dept: MRI IMAGING | Facility: HOSPITAL | Age: 56
Discharge: HOME | End: 2021-09-30
Attending: Other
Payer: COMMERCIAL

## 2021-09-30 DIAGNOSIS — M54.2 NECK PAIN: ICD-10-CM

## 2021-09-30 DIAGNOSIS — G51.32 CLONIC HEMIFACIAL SPASM OF MUSCLE OF LEFT SIDE OF FACE: ICD-10-CM

## 2021-09-30 PROCEDURE — 72141 MRI NECK SPINE W/O DYE: CPT | Performed by: OTHER

## 2021-12-17 RX ORDER — CLONAZEPAM 0.5 MG/1
TABLET ORAL
COMMUNITY
Start: 2021-09-16

## 2022-02-11 ENCOUNTER — TELEPHONE (OUTPATIENT)
Dept: OBGYN CLINIC | Facility: CLINIC | Age: 57
End: 2022-02-11

## 2022-02-11 NOTE — TELEPHONE ENCOUNTER
Pt c/o vaginal itchiness, skin crack. Denies discharge or malodorous smell. Has bumps noted on one side of her vagina. Denies new activity or post intercourse. She tried vagisil, wears cotton underwear and usually this helps. She thinks this could be from dryness. She takes estradiol, doesn't seem to help. She may take Replens or Bonafide OTC for vaginal dryness for some relief until her appt 2/15. To call office if s/s persist. Pt agreed with POC, verb.understanding.

## 2022-02-11 NOTE — TELEPHONE ENCOUNTER
Pt called to schedule gyne exam for itching with Dr. Jarret Torres (or first available). Scheduled for 2/15 with Dr. Jarret Torres but pt would like to know if there is something she can do in the meantime. Please advise.

## 2022-02-26 ENCOUNTER — TELEPHONE (OUTPATIENT)
Dept: FAMILY MEDICINE CLINIC | Facility: CLINIC | Age: 57
End: 2022-02-26

## 2022-02-26 NOTE — TELEPHONE ENCOUNTER
Please enter lab orders for the patient's upcoming physical appointment. Physical scheduled: Your appointments     Date & Time Appointment Department [facility])    Jul 26, 2022  9:00 AM CDT Physical - Established with Susanna Bloch, PA [address] [place] [address], [address] [place]  ([address] [place] [address])            [facility] Arce [address] [facility] [phone]         Preferred lab: [facility])     The patient has been notified to complete fasting labs prior to their physical appointment.

## 2022-03-23 ENCOUNTER — TELEPHONE (OUTPATIENT)
Dept: OBGYN CLINIC | Facility: CLINIC | Age: 57
End: 2022-03-23

## 2022-03-23 ENCOUNTER — LAB ENCOUNTER (OUTPATIENT)
Dept: LAB | Age: 57
End: 2022-03-23
Attending: OBSTETRICS & GYNECOLOGY
Payer: COMMERCIAL

## 2022-03-23 ENCOUNTER — OFFICE VISIT (OUTPATIENT)
Dept: OBGYN CLINIC | Facility: CLINIC | Age: 57
End: 2022-03-23
Payer: COMMERCIAL

## 2022-03-23 VITALS
BODY MASS INDEX: 26.13 KG/M2 | SYSTOLIC BLOOD PRESSURE: 120 MMHG | DIASTOLIC BLOOD PRESSURE: 60 MMHG | WEIGHT: 142 LBS | HEART RATE: 90 BPM | HEIGHT: 62 IN

## 2022-03-23 DIAGNOSIS — R10.2 PERINEAL PAIN: Primary | ICD-10-CM

## 2022-03-23 DIAGNOSIS — R10.2 PERINEAL PAIN: ICD-10-CM

## 2022-03-23 PROCEDURE — 3008F BODY MASS INDEX DOCD: CPT | Performed by: OBSTETRICS & GYNECOLOGY

## 2022-03-23 PROCEDURE — 3078F DIAST BP <80 MM HG: CPT | Performed by: OBSTETRICS & GYNECOLOGY

## 2022-03-23 PROCEDURE — 99212 OFFICE O/P EST SF 10 MIN: CPT | Performed by: OBSTETRICS & GYNECOLOGY

## 2022-03-23 PROCEDURE — 87529 HSV DNA AMP PROBE: CPT | Performed by: OBSTETRICS & GYNECOLOGY

## 2022-03-23 PROCEDURE — 86695 HERPES SIMPLEX TYPE 1 TEST: CPT | Performed by: OBSTETRICS & GYNECOLOGY

## 2022-03-23 PROCEDURE — 86696 HERPES SIMPLEX TYPE 2 TEST: CPT | Performed by: OBSTETRICS & GYNECOLOGY

## 2022-03-23 PROCEDURE — 3074F SYST BP LT 130 MM HG: CPT | Performed by: OBSTETRICS & GYNECOLOGY

## 2022-03-23 RX ORDER — CLONAZEPAM 2 MG/1
TABLET ORAL
COMMUNITY
Start: 2022-03-16

## 2022-03-23 RX ORDER — CLOBETASOL PROPIONATE 0.5 MG/G
1 OINTMENT TOPICAL 2 TIMES DAILY
Qty: 1 EACH | Refills: 3 | Status: SHIPPED | OUTPATIENT
Start: 2022-03-23

## 2022-03-23 NOTE — PROGRESS NOTES
Since January she started having some vulvar itching and burning. She noticed skin separation slits on both sides of the clitoris. Occasionally has bleeding. No vaginal itching or discharge. It comes and goes she has tried over-the-counter treatment. On exam there is superficial skin slit both sides the clitoris for about 2 cm. Is tender to touch. Herpes culture was ordered. Herpes blood was ordered. However the labia appeared to be thinning and scarring. I thinks much more likely that it is lichen sclerosis. So we will treat her with Temovate ointment.

## 2022-03-24 LAB — HSV2 DNA SPEC QL NAA+PROBE: NEGATIVE

## 2022-03-25 LAB
HSV 1 GLYCOPROTEIN G, IGG: POSITIVE
HSV 2 GLYCOPROTEIN G, IGG: NEGATIVE

## 2022-05-03 ENCOUNTER — TELEPHONE (OUTPATIENT)
Dept: FAMILY MEDICINE CLINIC | Facility: CLINIC | Age: 57
End: 2022-05-03

## 2022-05-03 NOTE — TELEPHONE ENCOUNTER
Pt states she fell 1 week ago and knee still having issues with bending and discomfort.   appt given for tomorrow

## 2022-05-03 NOTE — TELEPHONE ENCOUNTER
Patient is calling requesting to speak to nurse regarding left knee injury. Pt wants to make sure her knee is healing.  Please contact patient

## 2022-05-04 ENCOUNTER — HOSPITAL ENCOUNTER (OUTPATIENT)
Dept: GENERAL RADIOLOGY | Age: 57
Discharge: HOME | End: 2022-05-04
Attending: NURSE PRACTITIONER
Payer: COMMERCIAL

## 2022-05-04 ENCOUNTER — OFFICE VISIT (OUTPATIENT)
Dept: FAMILY MEDICINE CLINIC | Facility: CLINIC | Age: 57
End: 2022-05-04
Payer: COMMERCIAL

## 2022-05-04 VITALS
HEIGHT: 60 IN | TEMPERATURE: 98 F | HEART RATE: 80 BPM | RESPIRATION RATE: 16 BRPM | OXYGEN SATURATION: 99 % | SYSTOLIC BLOOD PRESSURE: 120 MMHG | WEIGHT: 143.81 LBS | BODY MASS INDEX: 28.23 KG/M2 | DIASTOLIC BLOOD PRESSURE: 78 MMHG

## 2022-05-04 DIAGNOSIS — M25.561 ACUTE PAIN OF BOTH KNEES: ICD-10-CM

## 2022-05-04 DIAGNOSIS — M25.562 ACUTE PAIN OF BOTH KNEES: ICD-10-CM

## 2022-05-04 DIAGNOSIS — S80.212A ABRASION OF LEFT KNEE, INITIAL ENCOUNTER: ICD-10-CM
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
## 2022-05-04 DIAGNOSIS — M25.562 ACUTE PAIN OF BOTH KNEES: Primary | ICD-10-CM

## 2022-05-04 DIAGNOSIS — M25.561 ACUTE PAIN OF BOTH KNEES: Primary | ICD-10-CM

## 2022-05-04 PROCEDURE — 99213 OFFICE O/P EST LOW 20 MIN: CPT | Performed by: NURSE PRACTITIONER

## 2022-05-04 PROCEDURE — 3078F DIAST BP <80 MM HG: CPT | Performed by: NURSE PRACTITIONER

## 2022-05-04 PROCEDURE — 73560 X-RAY EXAM OF KNEE 1 OR 2: CPT | Performed by: NURSE PRACTITIONER

## 2022-05-04 PROCEDURE — 3074F SYST BP LT 130 MM HG: CPT | Performed by: NURSE PRACTITIONER

## 2022-05-04 PROCEDURE — 3008F BODY MASS INDEX DOCD: CPT | Performed by: NURSE PRACTITIONER

## 2022-05-04 RX ORDER — PROGESTERONE 200 MG/1
200 CAPSULE ORAL AS DIRECTED
COMMUNITY
Start: 2022-04-14

## 2022-05-04 RX ORDER — CLONAZEPAM 1 MG/1
TABLET ORAL
COMMUNITY
Start: 2022-02-16

## 2022-05-04 NOTE — PATIENT INSTRUCTIONS
Apply Bacitracin to left knee abrasion, twice daily for 7 day. Cover with band aid during the day. Leave open to air at night.

## 2022-07-16 ENCOUNTER — HOSPITAL ENCOUNTER (EMERGENCY)
Facility: HOSPITAL | Age: 57
Discharge: HOME | End: 2022-07-16
Attending: EMERGENCY MEDICINE
Payer: COMMERCIAL

## 2022-07-16 ENCOUNTER — APPOINTMENT (OUTPATIENT)
Dept: CT IMAGING | Facility: HOSPITAL | Age: 57
End: 2022-07-16
Attending: EMERGENCY MEDICINE
Payer: COMMERCIAL

## 2022-07-16 VITALS
BODY MASS INDEX: 25.68 KG/M2 | TEMPERATURE: 98 F | OXYGEN SATURATION: 100 % | DIASTOLIC BLOOD PRESSURE: 61 MMHG | RESPIRATION RATE: 20 BRPM | HEART RATE: 75 BPM | SYSTOLIC BLOOD PRESSURE: 105 MMHG | HEIGHT: 61 IN | WEIGHT: 136 LBS

## 2022-07-16 DIAGNOSIS — K57.92 ACUTE DIVERTICULITIS: Primary | ICD-10-CM

## 2022-07-16 LAB
ALBUMIN SERPL-MCNC: 3.3 G/DL (ref 3.4–5)
ALBUMIN/GLOB SERPL: 0.8 {RATIO} (ref 1–2)
ALP LIVER SERPL-CCNC: 88 U/L
ALT SERPL-CCNC: 22 U/L
ANION GAP SERPL CALC-SCNC: 5 MMOL/L (ref 0–18)
AST SERPL-CCNC: 17 U/L (ref 15–37)
BASOPHILS # BLD AUTO: 0.04 X10(3) UL (ref 0–0.2)
BASOPHILS NFR BLD AUTO: 0.3 %
BILIRUB SERPL-MCNC: 0.6 MG/DL (ref 0.1–2)
BILIRUB UR QL STRIP.AUTO: NEGATIVE
BUN BLD-MCNC: 8 MG/DL (ref 7–18)
CALCIUM BLD-MCNC: 9.5 MG/DL (ref 8.5–10.1)
CHLORIDE SERPL-SCNC: 104 MMOL/L (ref 98–112)
CLARITY UR REFRACT.AUTO: CLEAR
CO2 SERPL-SCNC: 27 MMOL/L (ref 21–32)
CREAT BLD-MCNC: 0.86 MG/DL
EOSINOPHIL # BLD AUTO: 0.02 X10(3) UL (ref 0–0.7)
EOSINOPHIL NFR BLD AUTO: 0.1 %
ERYTHROCYTE [DISTWIDTH] IN BLOOD BY AUTOMATED COUNT: 14.3 %
GLOBULIN PLAS-MCNC: 4.4 G/DL (ref 2.8–4.4)
GLUCOSE BLD-MCNC: 110 MG/DL (ref 70–99)
GLUCOSE UR STRIP.AUTO-MCNC: NEGATIVE MG/DL
HCT VFR BLD AUTO: 36.6 %
HGB BLD-MCNC: 11.8 G/DL
IMM GRANULOCYTES # BLD AUTO: 0.04 X10(3) UL (ref 0–1)
IMM GRANULOCYTES NFR BLD: 0.3 %
KETONES UR STRIP.AUTO-MCNC: NEGATIVE MG/DL
LEUKOCYTE ESTERASE UR QL STRIP.AUTO: NEGATIVE
LIPASE SERPL-CCNC: 169 U/L (ref 73–393)
LYMPHOCYTES # BLD AUTO: 1.21 X10(3) UL (ref 1–4)
LYMPHOCYTES NFR BLD AUTO: 9 %
MCH RBC QN AUTO: 28.5 PG (ref 26–34)
MCHC RBC AUTO-ENTMCNC: 32.2 G/DL (ref 31–37)
MCV RBC AUTO: 88.4 FL
MONOCYTES # BLD AUTO: 1.19 X10(3) UL (ref 0.1–1)
MONOCYTES NFR BLD AUTO: 8.9 %
NEUTROPHILS # BLD AUTO: 10.92 X10 (3) UL (ref 1.5–7.7)
NEUTROPHILS # BLD AUTO: 10.92 X10(3) UL (ref 1.5–7.7)
NEUTROPHILS NFR BLD AUTO: 81.4 %
NITRITE UR QL STRIP.AUTO: NEGATIVE
OSMOLALITY SERPL CALC.SUM OF ELEC: 281 MOSM/KG (ref 275–295)
PH UR STRIP.AUTO: 7 [PH] (ref 5–8)
PLATELET # BLD AUTO: 389 10(3)UL (ref 150–450)
POTASSIUM SERPL-SCNC: 4.2 MMOL/L (ref 3.5–5.1)
PROT SERPL-MCNC: 7.7 G/DL (ref 6.4–8.2)
PROT UR STRIP.AUTO-MCNC: NEGATIVE MG/DL
RBC # BLD AUTO: 4.14 X10(6)UL
SODIUM SERPL-SCNC: 136 MMOL/L (ref 136–145)
SP GR UR STRIP.AUTO: 1 (ref 1–1.03)
UROBILINOGEN UR STRIP.AUTO-MCNC: <2 MG/DL
WBC # BLD AUTO: 13.4 X10(3) UL (ref 4–11)

## 2022-07-16 PROCEDURE — 74177 CT ABD & PELVIS W/CONTRAST: CPT | Performed by: EMERGENCY MEDICINE

## 2022-07-16 PROCEDURE — 96374 THER/PROPH/DIAG INJ IV PUSH: CPT

## 2022-07-16 PROCEDURE — 96375 TX/PRO/DX INJ NEW DRUG ADDON: CPT

## 2022-07-16 PROCEDURE — 80053 COMPREHEN METABOLIC PANEL: CPT | Performed by: EMERGENCY MEDICINE

## 2022-07-16 PROCEDURE — 81001 URINALYSIS AUTO W/SCOPE: CPT | Performed by: EMERGENCY MEDICINE

## 2022-07-16 PROCEDURE — 96361 HYDRATE IV INFUSION ADD-ON: CPT

## 2022-07-16 PROCEDURE — 83690 ASSAY OF LIPASE: CPT | Performed by: EMERGENCY MEDICINE

## 2022-07-16 PROCEDURE — 85025 COMPLETE CBC W/AUTO DIFF WBC: CPT | Performed by: EMERGENCY MEDICINE

## 2022-07-16 PROCEDURE — 99284 EMERGENCY DEPT VISIT MOD MDM: CPT

## 2022-07-16 RX ORDER — ONDANSETRON 2 MG/ML
4 INJECTION INTRAMUSCULAR; INTRAVENOUS ONCE
Status: COMPLETED | OUTPATIENT
Start: 2022-07-16 | End: 2022-07-16

## 2022-07-16 RX ORDER — METRONIDAZOLE 500 MG/1
500 TABLET ORAL 3 TIMES DAILY
Qty: 30 TABLET | Refills: 0 | Status: SHIPPED | OUTPATIENT
Start: 2022-07-16 | End: 2022-07-26

## 2022-07-16 RX ORDER — KETOROLAC TROMETHAMINE 15 MG/ML
15 INJECTION, SOLUTION INTRAMUSCULAR; INTRAVENOUS ONCE
Status: COMPLETED | OUTPATIENT
Start: 2022-07-16 | End: 2022-07-16

## 2022-07-16 RX ORDER — IOHEXOL 350 MG/ML
75 INJECTION, SOLUTION INTRAVENOUS
Status: COMPLETED | OUTPATIENT
Start: 2022-07-16 | End: 2022-07-16

## 2022-07-16 RX ORDER — CIPROFLOXACIN 500 MG/1
500 TABLET, FILM COATED ORAL 2 TIMES DAILY
Qty: 20 TABLET | Refills: 0 | Status: SHIPPED | OUTPATIENT
Start: 2022-07-16 | End: 2022-07-26

## 2022-07-16 NOTE — ED INITIAL ASSESSMENT (HPI)
Lower abdominal pain radiating to lower back since few days . No urinary problem  . Fever this morning . Nauseated. h/o  Constipation

## 2022-07-25 ENCOUNTER — LAB ENCOUNTER (OUTPATIENT)
Dept: LAB | Age: 57
End: 2022-07-25
Attending: PHYSICIAN ASSISTANT
Payer: COMMERCIAL

## 2022-07-25 DIAGNOSIS — Z00.00 LABORATORY EXAMINATION ORDERED AS PART OF A COMPLETE PHYSICAL EXAMINATION: ICD-10-CM

## 2022-07-25 DIAGNOSIS — E55.9 VITAMIN D DEFICIENCY: ICD-10-CM

## 2022-07-25 LAB
ALBUMIN SERPL-MCNC: 3.6 G/DL (ref 3.4–5)
ALBUMIN/GLOB SERPL: 0.9 {RATIO} (ref 1–2)
ALP LIVER SERPL-CCNC: 69 U/L
ALT SERPL-CCNC: 30 U/L
ANION GAP SERPL CALC-SCNC: 3 MMOL/L (ref 0–18)
AST SERPL-CCNC: 32 U/L (ref 15–37)
BASOPHILS # BLD AUTO: 0.04 X10(3) UL (ref 0–0.2)
BASOPHILS NFR BLD AUTO: 0.7 %
BILIRUB SERPL-MCNC: 0.3 MG/DL (ref 0.1–2)
BUN BLD-MCNC: 7 MG/DL (ref 7–18)
CALCIUM BLD-MCNC: 9.4 MG/DL (ref 8.5–10.1)
CHLORIDE SERPL-SCNC: 111 MMOL/L (ref 98–112)
CHOLEST SERPL-MCNC: 165 MG/DL (ref ?–200)
CO2 SERPL-SCNC: 26 MMOL/L (ref 21–32)
CREAT BLD-MCNC: 1.02 MG/DL
EOSINOPHIL # BLD AUTO: 0.25 X10(3) UL (ref 0–0.7)
EOSINOPHIL NFR BLD AUTO: 4.4 %
ERYTHROCYTE [DISTWIDTH] IN BLOOD BY AUTOMATED COUNT: 15.1 %
FASTING PATIENT LIPID ANSWER: YES
FASTING STATUS PATIENT QL REPORTED: YES
GLOBULIN PLAS-MCNC: 3.9 G/DL (ref 2.8–4.4)
GLUCOSE BLD-MCNC: 96 MG/DL (ref 70–99)
HCT VFR BLD AUTO: 42.1 %
HDLC SERPL-MCNC: 79 MG/DL (ref 40–59)
HGB BLD-MCNC: 12.6 G/DL
IMM GRANULOCYTES # BLD AUTO: 0.01 X10(3) UL (ref 0–1)
IMM GRANULOCYTES NFR BLD: 0.2 %
LDLC SERPL CALC-MCNC: 75 MG/DL (ref ?–100)
LYMPHOCYTES # BLD AUTO: 0.95 X10(3) UL (ref 1–4)
LYMPHOCYTES NFR BLD AUTO: 16.8 %
MCH RBC QN AUTO: 27.8 PG (ref 26–34)
MCHC RBC AUTO-ENTMCNC: 29.9 G/DL (ref 31–37)
MCV RBC AUTO: 92.9 FL
MONOCYTES # BLD AUTO: 1.21 X10(3) UL (ref 0.1–1)
MONOCYTES NFR BLD AUTO: 21.4 %
NEUTROPHILS # BLD AUTO: 3.2 X10 (3) UL (ref 1.5–7.7)
NEUTROPHILS # BLD AUTO: 3.2 X10(3) UL (ref 1.5–7.7)
NEUTROPHILS NFR BLD AUTO: 56.5 %
NONHDLC SERPL-MCNC: 86 MG/DL (ref ?–130)
OSMOLALITY SERPL CALC.SUM OF ELEC: 288 MOSM/KG (ref 275–295)
PLATELET # BLD AUTO: 521 10(3)UL (ref 150–450)
POTASSIUM SERPL-SCNC: 4.4 MMOL/L (ref 3.5–5.1)
PROT SERPL-MCNC: 7.5 G/DL (ref 6.4–8.2)
RBC # BLD AUTO: 4.53 X10(6)UL
SODIUM SERPL-SCNC: 140 MMOL/L (ref 136–145)
TRIGL SERPL-MCNC: 52 MG/DL (ref 30–149)
TSI SER-ACNC: 3.08 MIU/ML (ref 0.36–3.74)
VIT D+METAB SERPL-MCNC: 39.6 NG/ML (ref 30–100)
VLDLC SERPL CALC-MCNC: 8 MG/DL (ref 0–30)
WBC # BLD AUTO: 5.7 X10(3) UL (ref 4–11)

## 2022-07-25 PROCEDURE — 80050 GENERAL HEALTH PANEL: CPT | Performed by: PHYSICIAN ASSISTANT

## 2022-07-25 PROCEDURE — 82306 VITAMIN D 25 HYDROXY: CPT | Performed by: PHYSICIAN ASSISTANT

## 2022-07-25 PROCEDURE — 80061 LIPID PANEL: CPT | Performed by: PHYSICIAN ASSISTANT

## 2022-07-26 ENCOUNTER — OFFICE VISIT (OUTPATIENT)
Dept: FAMILY MEDICINE CLINIC | Facility: CLINIC | Age: 57
End: 2022-07-26
Payer: COMMERCIAL

## 2022-07-26 VITALS
SYSTOLIC BLOOD PRESSURE: 108 MMHG | WEIGHT: 133.63 LBS | HEART RATE: 72 BPM | DIASTOLIC BLOOD PRESSURE: 70 MMHG | BODY MASS INDEX: 25.56 KG/M2 | RESPIRATION RATE: 14 BRPM | TEMPERATURE: 97 F | HEIGHT: 60.5 IN

## 2022-07-26 DIAGNOSIS — D75.839 THROMBOCYTOSIS: ICD-10-CM

## 2022-07-26 DIAGNOSIS — G51.4 FACIAL TWITCHING: ICD-10-CM

## 2022-07-26 DIAGNOSIS — Z00.00 WELL ADULT EXAM: Primary | ICD-10-CM

## 2022-07-26 PROCEDURE — 99396 PREV VISIT EST AGE 40-64: CPT | Performed by: PHYSICIAN ASSISTANT

## 2022-07-26 PROCEDURE — 3074F SYST BP LT 130 MM HG: CPT | Performed by: PHYSICIAN ASSISTANT

## 2022-07-26 PROCEDURE — 3078F DIAST BP <80 MM HG: CPT | Performed by: PHYSICIAN ASSISTANT

## 2022-07-26 PROCEDURE — 3008F BODY MASS INDEX DOCD: CPT | Performed by: PHYSICIAN ASSISTANT

## 2022-07-26 RX ORDER — BACLOFEN 10 MG/1
10 TABLET ORAL NIGHTLY
COMMUNITY
Start: 2022-07-15

## 2022-08-01 ENCOUNTER — HOSPITAL ENCOUNTER (OUTPATIENT)
Dept: MRI IMAGING | Age: 57
Discharge: HOME | End: 2022-08-01
Attending: Other
Payer: COMMERCIAL

## 2022-08-01 DIAGNOSIS — G51.32 CLONIC HEMIFACIAL SPASM OF MUSCLE OF LEFT SIDE OF FACE: ICD-10-CM

## 2022-08-01 PROCEDURE — A9575 INJ GADOTERATE MEGLUMI 0.1ML: HCPCS

## 2022-08-01 PROCEDURE — 70553 MRI BRAIN STEM W/O & W/DYE: CPT | Performed by: OTHER

## 2022-08-08 ENCOUNTER — LAB ENCOUNTER (OUTPATIENT)
Dept: LAB | Age: 57
End: 2022-08-08
Attending: PHYSICIAN ASSISTANT
Payer: COMMERCIAL

## 2022-08-08 DIAGNOSIS — D75.839 THROMBOCYTOSIS: ICD-10-CM

## 2022-08-08 LAB
BASOPHILS # BLD AUTO: 0.05 X10(3) UL (ref 0–0.2)
BASOPHILS NFR BLD AUTO: 0.9 %
DEPRECATED RDW RBC AUTO: 51.8 FL (ref 35.1–46.3)
EOSINOPHIL # BLD AUTO: 0.16 X10(3) UL (ref 0–0.7)
EOSINOPHIL NFR BLD AUTO: 3 %
ERYTHROCYTE [DISTWIDTH] IN BLOOD BY AUTOMATED COUNT: 15.8 % (ref 11–15)
HCT VFR BLD AUTO: 41.8 %
HGB BLD-MCNC: 12.9 G/DL
IMM GRANULOCYTES # BLD AUTO: 0.01 X10(3) UL (ref 0–1)
IMM GRANULOCYTES NFR BLD: 0.2 %
LYMPHOCYTES # BLD AUTO: 1.19 X10(3) UL (ref 1–4)
LYMPHOCYTES NFR BLD AUTO: 22.2 %
MCH RBC QN AUTO: 27.9 PG (ref 26–34)
MCHC RBC AUTO-ENTMCNC: 30.9 G/DL (ref 31–37)
MCV RBC AUTO: 90.3 FL
MONOCYTES # BLD AUTO: 0.76 X10(3) UL (ref 0.1–1)
MONOCYTES NFR BLD AUTO: 14.2 %
NEUTROPHILS # BLD AUTO: 3.2 X10 (3) UL (ref 1.5–7.7)
NEUTROPHILS # BLD AUTO: 3.2 X10(3) UL (ref 1.5–7.7)
NEUTROPHILS NFR BLD AUTO: 59.5 %
PLATELET # BLD AUTO: 362 10(3)UL (ref 150–450)
RBC # BLD AUTO: 4.63 X10(6)UL
WBC # BLD AUTO: 5.4 X10(3) UL (ref 4–11)

## 2022-08-08 PROCEDURE — 85025 COMPLETE CBC W/AUTO DIFF WBC: CPT | Performed by: PHYSICIAN ASSISTANT

## 2022-09-07 ENCOUNTER — OFFICE VISIT (OUTPATIENT)
Dept: OBGYN CLINIC | Facility: CLINIC | Age: 57
End: 2022-09-07
Payer: COMMERCIAL

## 2022-09-07 ENCOUNTER — TELEPHONE (OUTPATIENT)
Dept: OBGYN CLINIC | Facility: CLINIC | Age: 57
End: 2022-09-07

## 2022-09-07 VITALS
WEIGHT: 133.63 LBS | HEIGHT: 61 IN | SYSTOLIC BLOOD PRESSURE: 114 MMHG | BODY MASS INDEX: 25.23 KG/M2 | DIASTOLIC BLOOD PRESSURE: 76 MMHG | HEART RATE: 71 BPM

## 2022-09-07 DIAGNOSIS — Z12.31 ENCOUNTER FOR SCREENING MAMMOGRAM FOR BREAST CANCER: Primary | ICD-10-CM

## 2022-09-07 DIAGNOSIS — Z01.419 WELL WOMAN EXAM WITH ROUTINE GYNECOLOGICAL EXAM: ICD-10-CM

## 2022-09-07 DIAGNOSIS — Z12.4 PAPANICOLAOU SMEAR FOR CERVICAL CANCER SCREENING: ICD-10-CM

## 2022-09-07 PROCEDURE — 3078F DIAST BP <80 MM HG: CPT | Performed by: OBSTETRICS & GYNECOLOGY

## 2022-09-07 PROCEDURE — 3074F SYST BP LT 130 MM HG: CPT | Performed by: OBSTETRICS & GYNECOLOGY

## 2022-09-07 PROCEDURE — 99396 PREV VISIT EST AGE 40-64: CPT | Performed by: OBSTETRICS & GYNECOLOGY

## 2022-09-07 PROCEDURE — 3008F BODY MASS INDEX DOCD: CPT | Performed by: OBSTETRICS & GYNECOLOGY

## 2022-09-07 RX ORDER — PROGESTERONE 200 MG/1
200 CAPSULE ORAL AS DIRECTED
Qty: 90 CAPSULE | Refills: 5 | Status: SHIPPED | OUTPATIENT
Start: 2022-09-07

## 2022-09-07 RX ORDER — ESTRADIOL 1 MG/1
1 TABLET ORAL DAILY
Qty: 90 TABLET | Refills: 5 | Status: SHIPPED | OUTPATIENT
Start: 2022-09-07

## 2022-09-07 NOTE — TELEPHONE ENCOUNTER
[facility] calling, spoke to [place], verifying route, direction for Progesterone Rx, verb understanding.

## 2022-10-20 ENCOUNTER — OFFICE VISIT (OUTPATIENT)
Dept: FAMILY MEDICINE CLINIC | Facility: CLINIC | Age: 57
End: 2022-10-20
Payer: COMMERCIAL

## 2022-10-20 VITALS
SYSTOLIC BLOOD PRESSURE: 100 MMHG | TEMPERATURE: 98 F | DIASTOLIC BLOOD PRESSURE: 70 MMHG | RESPIRATION RATE: 14 BRPM | HEIGHT: 60.24 IN | HEART RATE: 90 BPM | WEIGHT: 129 LBS | OXYGEN SATURATION: 98 % | BODY MASS INDEX: 24.99 KG/M2

## 2022-10-20 DIAGNOSIS — R53.83 FATIGUE, UNSPECIFIED TYPE: ICD-10-CM

## 2022-10-20 DIAGNOSIS — J02.9 SORE THROAT: Primary | ICD-10-CM

## 2022-10-20 DIAGNOSIS — R09.81 SINUS CONGESTION: ICD-10-CM

## 2022-10-20 PROBLEM — Z01.419 WELL WOMAN EXAM WITH ROUTINE GYNECOLOGICAL EXAM: Status: RESOLVED | Noted: 2018-01-24 | Resolved: 2022-10-20

## 2022-10-20 PROBLEM — Z12.11 SPECIAL SCREENING FOR MALIGNANT NEOPLASMS, COLON: Status: RESOLVED | Noted: 2021-04-13 | Resolved: 2022-10-20

## 2022-10-20 PROBLEM — M84.375A STRESS REACTION OF LEFT FOOT, INITIAL ENCOUNTER: Status: RESOLVED | Noted: 2018-01-31 | Resolved: 2022-10-20

## 2022-10-20 LAB
CONTROL LINE PRESENT WITH A CLEAR BACKGROUND (YES/NO): YES YES/NO
KIT LOT #: NORMAL NUMERIC
STREP GRP A CUL-SCR: POSITIVE

## 2022-10-20 PROCEDURE — 87880 STREP A ASSAY W/OPTIC: CPT | Performed by: NURSE PRACTITIONER

## 2022-10-20 PROCEDURE — 3008F BODY MASS INDEX DOCD: CPT | Performed by: NURSE PRACTITIONER

## 2022-10-20 PROCEDURE — 3078F DIAST BP <80 MM HG: CPT | Performed by: NURSE PRACTITIONER

## 2022-10-20 PROCEDURE — 3074F SYST BP LT 130 MM HG: CPT | Performed by: NURSE PRACTITIONER

## 2022-10-20 PROCEDURE — 99213 OFFICE O/P EST LOW 20 MIN: CPT | Performed by: NURSE PRACTITIONER

## 2022-10-20 RX ORDER — CEPHALEXIN 500 MG/1
500 CAPSULE ORAL 2 TIMES DAILY
Qty: 20 CAPSULE | Refills: 0 | Status: SHIPPED | OUTPATIENT
Start: 2022-10-20

## 2022-10-20 NOTE — PATIENT INSTRUCTIONS
Gargle with warm salt water solution 3-5 times daily. Dissolve 1/2 teaspoon salt in half cup of warm tap water. Gargle and spit. Try a premixed saline nasal spray, available over the counter, such as Golf Nasal Spray (or generic equivalent), 2-4 times daily. Do one spray each nostril and gently blow nose after. Get plenty of rest and drink extra fluids.

## 2022-10-27 ENCOUNTER — HOSPITAL ENCOUNTER (OUTPATIENT)
Dept: MAMMOGRAPHY | Age: 57
Discharge: HOME | End: 2022-10-27
Attending: OBSTETRICS & GYNECOLOGY
Payer: COMMERCIAL

## 2022-10-27 DIAGNOSIS — Z12.31 ENCOUNTER FOR SCREENING MAMMOGRAM FOR BREAST CANCER: ICD-10-CM

## 2022-10-27 PROCEDURE — 77063 BREAST TOMOSYNTHESIS BI: CPT | Performed by: OBSTETRICS & GYNECOLOGY

## 2022-10-27 PROCEDURE — 77067 SCR MAMMO BI INCL CAD: CPT | Performed by: OBSTETRICS & GYNECOLOGY

## 2022-12-13 ENCOUNTER — HOSPITAL ENCOUNTER (OUTPATIENT)
Dept: CT IMAGING | Age: 57
Discharge: HOME | End: 2022-12-13
Attending: NEUROLOGICAL SURGERY

## 2022-12-13 ENCOUNTER — HOSPITAL ENCOUNTER (OUTPATIENT)
Dept: MRI IMAGING | Age: 57
Discharge: HOME | End: 2022-12-13
Attending: NEUROLOGICAL SURGERY

## 2022-12-13 DIAGNOSIS — G51.32 CLONIC HEMIFACIAL SPASM, LEFT: ICD-10-CM

## 2022-12-13 PROCEDURE — 10002805 HB CONTRAST AGENT: Performed by: NEUROLOGICAL SURGERY

## 2022-12-13 PROCEDURE — 70450 CT HEAD/BRAIN W/O DYE: CPT

## 2022-12-13 PROCEDURE — 70553 MRI BRAIN STEM W/O & W/DYE: CPT

## 2022-12-13 PROCEDURE — A9585 GADOBUTROL INJECTION: HCPCS | Performed by: NEUROLOGICAL SURGERY

## 2022-12-13 RX ORDER — GADOBUTROL 604.72 MG/ML
5.9 INJECTION INTRAVENOUS ONCE
Status: COMPLETED | OUTPATIENT
Start: 2022-12-13 | End: 2022-12-13

## 2022-12-13 RX ADMIN — GADOBUTROL 5.9 ML: 604.72 INJECTION INTRAVENOUS at 14:53

## 2023-01-05 ENCOUNTER — APPOINTMENT (OUTPATIENT)
Dept: GENERAL RADIOLOGY | Facility: HOSPITAL | Age: 58
End: 2023-01-05
Payer: COMMERCIAL

## 2023-01-05 ENCOUNTER — HOSPITAL ENCOUNTER (EMERGENCY)
Facility: HOSPITAL | Age: 58
Discharge: HOME | End: 2023-01-05
Attending: EMERGENCY MEDICINE
Payer: COMMERCIAL

## 2023-01-05 VITALS
SYSTOLIC BLOOD PRESSURE: 137 MMHG | BODY MASS INDEX: 24.17 KG/M2 | WEIGHT: 128 LBS | RESPIRATION RATE: 18 BRPM | OXYGEN SATURATION: 100 % | HEIGHT: 61 IN | HEART RATE: 78 BPM | DIASTOLIC BLOOD PRESSURE: 87 MMHG | TEMPERATURE: 98 F

## 2023-01-05 DIAGNOSIS — S93.601A SPRAIN OF RIGHT FOOT, INITIAL ENCOUNTER: Primary | ICD-10-CM

## 2023-01-05 PROCEDURE — 73630 X-RAY EXAM OF FOOT: CPT | Performed by: EMERGENCY MEDICINE

## 2023-01-05 PROCEDURE — 99283 EMERGENCY DEPT VISIT LOW MDM: CPT

## 2023-01-05 NOTE — ED INITIAL ASSESSMENT (HPI)
Pt to ER after she tripped and fell on stairs prior to arrival. Complains of pain & swelling to right foot. NO LOC  NOT on blood thinners.

## 2023-02-21 ENCOUNTER — TELEPHONE (OUTPATIENT)
Dept: FAMILY MEDICINE CLINIC | Facility: CLINIC | Age: 58
End: 2023-02-21

## 2023-02-21 DIAGNOSIS — Z01.818 PRE-OP TESTING: Primary | ICD-10-CM

## 2023-02-21 NOTE — TELEPHONE ENCOUNTER
Placed orders for CXR BMP PT/INR PTT CBC. Needs type and screen and covid placed at hospital doing the surgery.  The patient will call the surgeon to see if he will order this through [facility] [phone]

## 2023-02-21 NOTE — TELEPHONE ENCOUNTER
Patient having Left Retromastoid Craniotomy with Dr. Valerie Holliday 03/09/23. Patient needs Clearance as well as BMP, CBC with Diff, PT/INR/PTT, Covid PCR, EKG, Chest X-Ray, Type and Screen. Patient is scheduled with Dr. Mandie Rice 02/22/23.    Fax clearance to [facility] at [phone] and [facility] Pre surgical testing at [phone]  Fax in triage

## 2023-02-22 ENCOUNTER — OFFICE VISIT (OUTPATIENT)
Dept: FAMILY MEDICINE CLINIC | Facility: CLINIC | Age: 58
End: 2023-02-22
Payer: COMMERCIAL

## 2023-02-22 VITALS
SYSTOLIC BLOOD PRESSURE: 124 MMHG | BODY MASS INDEX: 24.92 KG/M2 | DIASTOLIC BLOOD PRESSURE: 70 MMHG | OXYGEN SATURATION: 100 % | HEIGHT: 61 IN | HEART RATE: 95 BPM | WEIGHT: 132 LBS

## 2023-02-22 DIAGNOSIS — Z01.818 PREOP EXAMINATION: Primary | ICD-10-CM

## 2023-02-22 LAB
ATRIAL RATE: 74 BPM
P AXIS: 55 DEGREES
P-R INTERVAL: 148 MS
Q-T INTERVAL: 388 MS
QRS DURATION: 76 MS
QTC CALCULATION (BEZET): 430 MS
R AXIS: -7 DEGREES
T AXIS: 34 DEGREES
VENTRICULAR RATE: 74 BPM

## 2023-02-22 PROCEDURE — 3078F DIAST BP <80 MM HG: CPT | Performed by: STUDENT IN AN ORGANIZED HEALTH CARE EDUCATION/TRAINING PROGRAM

## 2023-02-22 PROCEDURE — 93000 ELECTROCARDIOGRAM COMPLETE: CPT | Performed by: STUDENT IN AN ORGANIZED HEALTH CARE EDUCATION/TRAINING PROGRAM

## 2023-02-22 PROCEDURE — 3074F SYST BP LT 130 MM HG: CPT | Performed by: STUDENT IN AN ORGANIZED HEALTH CARE EDUCATION/TRAINING PROGRAM

## 2023-02-22 PROCEDURE — 3008F BODY MASS INDEX DOCD: CPT | Performed by: STUDENT IN AN ORGANIZED HEALTH CARE EDUCATION/TRAINING PROGRAM

## 2023-02-22 PROCEDURE — 99244 OFF/OP CNSLTJ NEW/EST MOD 40: CPT | Performed by: STUDENT IN AN ORGANIZED HEALTH CARE EDUCATION/TRAINING PROGRAM

## 2023-02-24 ENCOUNTER — HOSPITAL ENCOUNTER (OUTPATIENT)
Dept: GENERAL RADIOLOGY | Age: 58
Discharge: HOME | End: 2023-02-24
Attending: STUDENT IN AN ORGANIZED HEALTH CARE EDUCATION/TRAINING PROGRAM
Payer: COMMERCIAL

## 2023-02-24 ENCOUNTER — LABORATORY ENCOUNTER (OUTPATIENT)
Dept: LAB | Age: 58
End: 2023-02-24
Attending: STUDENT IN AN ORGANIZED HEALTH CARE EDUCATION/TRAINING PROGRAM
Payer: COMMERCIAL

## 2023-02-24 DIAGNOSIS — Z01.818 PRE-OP TESTING: ICD-10-CM

## 2023-02-24 LAB
ANION GAP SERPL CALC-SCNC: 5 MMOL/L (ref 0–18)
APTT PPP: 29.7 SECONDS (ref 23.3–35.6)
BASOPHILS # BLD AUTO: 0.04 X10(3) UL (ref 0–0.2)
BASOPHILS NFR BLD AUTO: 0.8 %
BUN BLD-MCNC: 13 MG/DL (ref 7–18)
CALCIUM BLD-MCNC: 9.2 MG/DL (ref 8.5–10.1)
CHLORIDE SERPL-SCNC: 103 MMOL/L (ref 98–112)
CO2 SERPL-SCNC: 27 MMOL/L (ref 21–32)
CREAT BLD-MCNC: 0.86 MG/DL
EOSINOPHIL # BLD AUTO: 0.07 X10(3) UL (ref 0–0.7)
EOSINOPHIL NFR BLD AUTO: 1.4 %
ERYTHROCYTE [DISTWIDTH] IN BLOOD BY AUTOMATED COUNT: 15.2 %
FASTING STATUS PATIENT QL REPORTED: NO
GFR SERPLBLD BASED ON 1.73 SQ M-ARVRAT: 78 ML/MIN/1.73M2 (ref 60–?)
GLUCOSE BLD-MCNC: 93 MG/DL (ref 70–99)
HCT VFR BLD AUTO: 38.5 %
HGB BLD-MCNC: 12.2 G/DL
IMM GRANULOCYTES # BLD AUTO: 0.02 X10(3) UL (ref 0–1)
IMM GRANULOCYTES NFR BLD: 0.4 %
INR BLD: 0.97 (ref 0.85–1.16)
LYMPHOCYTES # BLD AUTO: 0.93 X10(3) UL (ref 1–4)
LYMPHOCYTES NFR BLD AUTO: 18.2 %
MCH RBC QN AUTO: 28.8 PG (ref 26–34)
MCHC RBC AUTO-ENTMCNC: 31.7 G/DL (ref 31–37)
MCV RBC AUTO: 90.8 FL
MONOCYTES # BLD AUTO: 0.72 X10(3) UL (ref 0.1–1)
MONOCYTES NFR BLD AUTO: 14.1 %
NEUTROPHILS # BLD AUTO: 3.34 X10 (3) UL (ref 1.5–7.7)
NEUTROPHILS # BLD AUTO: 3.34 X10(3) UL (ref 1.5–7.7)
NEUTROPHILS NFR BLD AUTO: 65.1 %
OSMOLALITY SERPL CALC.SUM OF ELEC: 280 MOSM/KG (ref 275–295)
PLATELET # BLD AUTO: 401 10(3)UL (ref 150–450)
POTASSIUM SERPL-SCNC: 3.9 MMOL/L (ref 3.5–5.1)
PROTHROMBIN TIME: 12.9 SECONDS (ref 11.6–14.8)
RBC # BLD AUTO: 4.24 X10(6)UL
SODIUM SERPL-SCNC: 135 MMOL/L (ref 136–145)
WBC # BLD AUTO: 5.1 X10(3) UL (ref 4–11)

## 2023-02-24 PROCEDURE — 85025 COMPLETE CBC W/AUTO DIFF WBC: CPT | Performed by: STUDENT IN AN ORGANIZED HEALTH CARE EDUCATION/TRAINING PROGRAM

## 2023-02-24 PROCEDURE — 71046 X-RAY EXAM CHEST 2 VIEWS: CPT | Performed by: STUDENT IN AN ORGANIZED HEALTH CARE EDUCATION/TRAINING PROGRAM

## 2023-02-24 PROCEDURE — 85730 THROMBOPLASTIN TIME PARTIAL: CPT | Performed by: STUDENT IN AN ORGANIZED HEALTH CARE EDUCATION/TRAINING PROGRAM

## 2023-02-24 PROCEDURE — 85610 PROTHROMBIN TIME: CPT | Performed by: STUDENT IN AN ORGANIZED HEALTH CARE EDUCATION/TRAINING PROGRAM

## 2023-02-24 PROCEDURE — 80048 BASIC METABOLIC PNL TOTAL CA: CPT | Performed by: STUDENT IN AN ORGANIZED HEALTH CARE EDUCATION/TRAINING PROGRAM

## 2023-03-13 ENCOUNTER — PATIENT OUTREACH (OUTPATIENT)
Dept: CASE MANAGEMENT | Age: 58
End: 2023-03-13

## 2023-03-13 NOTE — PROGRESS NOTES
Attempted to contact pt for TCM however no answer. Call rang three times and then disconnected. Unable to leave a VM at this time. NCM to try again at a later time.

## 2023-03-14 ENCOUNTER — TELEPHONE (OUTPATIENT)
Dept: FAMILY MEDICINE CLINIC | Facility: CLINIC | Age: 58
End: 2023-03-14

## 2023-03-14 NOTE — TELEPHONE ENCOUNTER
Pt is calling because she has a rash near her incision site and is worried about it. Has an apt tomorrow at 130 with Arturo Rodas but would like to be seen sooner.  Will be sending a pic through My Chart

## 2023-04-10 DIAGNOSIS — G51.39 CLONIC HEMIFACIAL SPASM: Primary | ICD-10-CM

## 2023-05-22 ENCOUNTER — HOSPITAL ENCOUNTER (OUTPATIENT)
Dept: CT IMAGING | Age: 58
Discharge: HOME | End: 2023-05-22
Attending: NEUROLOGICAL SURGERY

## 2023-05-22 DIAGNOSIS — G51.39 CLONIC HEMIFACIAL SPASM: ICD-10-CM

## 2023-05-22 PROCEDURE — 70450 CT HEAD/BRAIN W/O DYE: CPT

## 2023-05-22 PROCEDURE — G1004 CDSM NDSC: HCPCS

## 2023-10-30 ENCOUNTER — APPOINTMENT (OUTPATIENT)
Dept: GENERAL RADIOLOGY | Facility: HOSPITAL | Age: 58
End: 2023-10-30
Attending: EMERGENCY MEDICINE
Payer: COMMERCIAL

## 2023-10-30 ENCOUNTER — HOSPITAL ENCOUNTER (EMERGENCY)
Facility: HOSPITAL | Age: 58
Discharge: HOME | End: 2023-10-30
Attending: EMERGENCY MEDICINE
Payer: COMMERCIAL

## 2023-10-30 VITALS
RESPIRATION RATE: 16 BRPM | HEART RATE: 83 BPM | TEMPERATURE: 97 F | HEIGHT: 62 IN | SYSTOLIC BLOOD PRESSURE: 134 MMHG | WEIGHT: 135 LBS | BODY MASS INDEX: 24.84 KG/M2 | OXYGEN SATURATION: 97 % | DIASTOLIC BLOOD PRESSURE: 90 MMHG

## 2023-10-30 DIAGNOSIS — S51.012A ELBOW LACERATION, LEFT, INITIAL ENCOUNTER: Primary | ICD-10-CM

## 2023-10-30 PROCEDURE — 99284 EMERGENCY DEPT VISIT MOD MDM: CPT

## 2023-10-30 PROCEDURE — 90471 IMMUNIZATION ADMIN: CPT

## 2023-10-30 PROCEDURE — 73080 X-RAY EXAM OF ELBOW: CPT | Performed by: EMERGENCY MEDICINE

## 2023-10-30 PROCEDURE — 12001 RPR S/N/AX/GEN/TRNK 2.5CM/<: CPT

## 2023-10-30 NOTE — ED INITIAL ASSESSMENT (HPI)
Pt is awake and alert, on Saturday she had mechanical fall going up 4 stairs from garage to house, has laceration to L elbow area, pt has mild pain to the area

## 2023-10-31 ENCOUNTER — OFFICE VISIT (OUTPATIENT)
Facility: CLINIC | Age: 58
End: 2023-10-31

## 2023-10-31 VITALS
WEIGHT: 137 LBS | SYSTOLIC BLOOD PRESSURE: 130 MMHG | DIASTOLIC BLOOD PRESSURE: 84 MMHG | HEIGHT: 61 IN | BODY MASS INDEX: 25.86 KG/M2 | HEART RATE: 87 BPM

## 2023-10-31 DIAGNOSIS — Z01.419 WELL WOMAN EXAM WITH ROUTINE GYNECOLOGICAL EXAM: Primary | ICD-10-CM

## 2023-10-31 DIAGNOSIS — Z12.31 BREAST CANCER SCREENING BY MAMMOGRAM: ICD-10-CM

## 2023-10-31 DIAGNOSIS — N95.0 POSTMENOPAUSAL BLEEDING: ICD-10-CM

## 2023-10-31 PROCEDURE — 99396 PREV VISIT EST AGE 40-64: CPT

## 2023-10-31 PROCEDURE — 58100 BIOPSY OF UTERUS LINING: CPT

## 2023-10-31 PROCEDURE — 3008F BODY MASS INDEX DOCD: CPT

## 2023-10-31 PROCEDURE — 3079F DIAST BP 80-89 MM HG: CPT

## 2023-10-31 PROCEDURE — 88305 TISSUE EXAM BY PATHOLOGIST: CPT

## 2023-10-31 PROCEDURE — 3075F SYST BP GE 130 - 139MM HG: CPT

## 2023-10-31 RX ORDER — MELATONIN
325
COMMUNITY

## 2023-10-31 NOTE — PROGRESS NOTES
Procedure: Endometrial biopsy     Date of Procedure: 10/31/2023    Pre-procedure diagnosis:  Postmenopausal bleeding     Post-procedure diagnosis:   same    Indications:   59year old  female who presents for endometrial biopsy. Procedure details: The procedure, risks, benefits and alternatives were discussed with the patient. The patient was informed of risks including but not limited to the risk of bleeding, infection, injury and insufficient tissue collection. All questions and concerns were addressed. The patient provided verbal and written consent. The patient was placed in a supine position with feet positioned into stirrups. A sterile speculum was placed into the vagina and the cervix was visualized with findings noted below. The cervix was cleaned and prepped with betadine. An tenaculum  was placed on the anterior lip of the cervix. The endometrial Pipelle was then advanced through the cervical canal. The Pipelle was then engaged with suction force noted and advance in various angles along the uterine cavity. A moderate amount of endometrial tissue was noted and collected to be sent to pathology. This was repeated for one pass. The tenaculum was removed. Good hemostasis noted. The patient tolerated the procedure well. The patient was advised to get pelvic US     Findings:   Normal cervix, no lesions   Normal uterus,   Scant amount of endometrial tissue   S/p EMB  Good hemostasis     Disposition: Stable    Complications: None    Follow up:  await results         Discussed with patient that there will not be further notification of normal or benign results other than receiving results on Qewz. A Qewz message or telephone call will be placed by the physician and/or office staff if results are abnormal.     Mariam Perea is a 59year old female [id number] Patient's last menstrual period was 05/06/2014.  Patient presents with:  Wellness Visit  Gyn Problem: Discuss postmenopausal bleeding. .  She c/o postmenopausal bleeding for 5 days.      OBSTETRICS HISTORY:  OB History    Para Term  AB Living   3 3 3     3   SAB IAB Ectopic Multiple Live Births           3      # Outcome Date GA Lbr Sd/2nd Weight Sex Delivery Anes PTL Lv   3 Term 01 40w0d   F NORMAL SPONT   VIRIDIANA   2 Term 99 40w0d   F NORMAL SPONT   VIRIDIANA   1 Term 06/10/96 40w0d   F NORMAL SPONT   VIRIDIANA       GYNE HISTORY:  Periods none due to menopause    Sexual activity:   Yes      Partners:   Male        Pap Date: 22  Pap Result Notes: negative        MEDICAL HISTORY:  Past Medical History:   Diagnosis Date    Anemia     Arthritis     Back pain     Bloating     Body piercing     Constipation     H/O bone scan 2013    H/O mammogram ,12/15,    benign    Headache disorder     Hemifacial spasm of left side of face     IBS (irritable bowel syndrome)     Pain in joints     Pap smear for cervical cancer screening ,    negative    Sleep disturbance     Sprain of hand, unspecified site 2013    Specify: palmar interosseous strain Log Date: 2012     Stress reaction of left foot, initial encounter 2018    Ulcer     Wears glasses     Weight gain        SURGICAL HISTORY:  Past Surgical History:   Procedure Laterality Date    Colonoscopy      Create eardrum opening,gen anesth  8-10 yrs    left ear, replaced 2x    Jose biopsy stereo nodule 1 site right (cpt=19081)  2012    benign    Other surgical history  14    cervical dissectomy C5-C6    Other surgical history      rt thumb    Other surgical history      foot    Upper gi endoscopy,exam  4 yrs    Us breast biopsy 1 site right (cpt=19083)      BENIGN       SOCIAL HISTORY:  Social History    Socioeconomic History      Marital status:       Spouse name: Not on file      Number of children: Not on file      Years of education: Not on file      Highest education level: Not on file    Occupational History Occupation: Homemaker    Tobacco Use      Smoking status: Never      Smokeless tobacco: Never    Vaping Use      Vaping Use: Never used    Substance and Sexual Activity      Alcohol use: Yes      Drug use: No      Sexual activity: Yes        Partners: Male    Other Topics      Concerns:        Caffeine Concern: Yes          Tea daily-4-5 cups        Exercise: Yes          walks 4-5 times daily and biking        Seat Belt: Yes        Special Diet: No        Stress Concern: No        Weight Concern: No         Service: Not Asked        Blood Transfusions: Not Asked        Occupational Exposure: Not Asked        Hobby Hazards: Not Asked        Sleep Concern: Not Asked        Back Care: Not Asked        Bike Helmet: Not Asked        Self-Exams: No    Social History Narrative      Not on file    Social Determinants of Health  Financial Resource Strain: Not on file  Food Insecurity: Not on file  Transportation Needs: Not on file  Physical Activity: Not on file  Stress: Not on file  Social Connections: Not on file  Housing Stability: Not on file    FAMILY HISTORY:  Family History   Problem Relation Age of Onset    Breast Cancer Maternal Grandmother 76    Arthritis Maternal Grandmother     Thyroid disease Father     Heart Disorder Father         pacemaker    Thyroid disease Mother     Musculo-skelatal Disorder Mother         knee replacement    Heart Disorder Mother         pacemaker    Diabetes Paternal Grandmother     No Known Problems Brother     No Known Problems Brother     No Known Problems Brother     No Known Problems Daughter     No Known Problems Daughter        MEDICATIONS:    Current Outpatient Medications:     ferrous sulfate 325 (65 FE) MG Oral Tab EC, Take 1 tablet (325 mg total) by mouth daily with breakfast., Disp: , Rfl:     estradiol (ESTRACE) 1 MG Oral Tab, Take 1 tablet (1 mg total) by mouth daily. , Disp: 90 tablet, Rfl: 5    progesterone 200 MG Oral Cap, Take 1 capsule (200 mg total) by mouth As Directed., Disp: 90 capsule, Rfl: 5    Multiple Vitamin (MULTIVITAMIN ADULT OR), Take 1 tablet by mouth daily. , Disp: , Rfl:     Probiotic Product (ALIGN) 4 MG Oral Cap, Take 1 capsule by mouth daily. , Disp: , Rfl:     Turmeric 500 MG Oral Tab, Take 1 tablet by mouth daily. , Disp: , Rfl:     ALLERGIES:    Penicillins             OTHER (SEE COMMENTS)    Comment:Pt unsure             Other reaction(s): Rash/Hives/Urticaria      Review of Systems:  Constitutional:  Denies fatigue, night sweats, hot flashes  Eyes:  denies blurred or double vision  Cardiovascular:  denies chest pain or palpitations  Respiratory:  denies shortness of breath  Gastrointestinal:  denies heartburn, abdominal pain, diarrhea or constipation  Genitourinary:  denies dysuria, incontinence, abnormal vaginal discharge, vaginal itching  Musculoskeletal:  denies back pain. Skin/Breast:  Denies any breast pain, lumps, or discharge. Neurological:  denies headaches, extremity weakness or numbness. Psychiatric: denies depression or anxiety. Endocrine:   denies excessive thirst or urination. Heme/Lymph:  denies history of anemia, easy bruising or bleeding. PHYSICAL EXAM:   Constitutional: well developed, well nourished  Head/Face: normocephalic  Neck/Thyroid: thyroid symmetric, no thyromegaly, no nodules, no adenopathy  Lymphatic:no abnormal supraclavicular or axillary adenopathy is noted  Breast: normal without palpable masses, tenderness, asymmetry, nipple discharge, nipple retraction or skin changes  Abdomen:  soft, nontender, nondistended, no masses  Skin/Hair: no unusual rashes or bruises  Extremities: no edema, no cyanosis  Psychiatric:  Oriented to time, place, person and situation.  Appropriate mood and affect    Pelvic Exam:  External Genitalia: normal appearance, hair distribution, and no lesions  Urethral Meatus:  normal in size, location, without lesions and prolapse  Bladder:  No fullness, masses or tenderness  Vagina:  Normal appearance without lesions, no abnormal discharge  Cervix:  Normal without tenderness on motion, Nathobian cyst noted   Uterus: normal in size, contour, position, mobility, without tenderness  Adnexa: normal without masses or tenderness  Perineum: normal  Anus: no hemorroids     Assessment & Plan:  Diagnoses and all orders for this visit:    Well woman exam with routine gynecological exam    Postmenopausal bleeding  -     Pelvic US Complete GYNE Only [80549/83549]; Future  -     Specimen to pathology , tissue; Future    Breast cancer screening by mammogram  -     [facility] KAMILLE 2D+3D SCREENING BILAT (CPT=77067/80342);  Future rolling walker

## 2023-11-01 ENCOUNTER — ULTRASOUND ENCOUNTER (OUTPATIENT)
Facility: CLINIC | Age: 58
End: 2023-11-01
Payer: COMMERCIAL

## 2023-11-02 NOTE — PROGRESS NOTES
Patient aware of pathology results and recommendations. ALFREDITO viewed US and pathology - can offer Provera 10 mg for 7 days to shed the rest of her lining. Will route. Overall results negative of both US and pathology report. Patient verbalized understanding.

## 2023-11-03 RX ORDER — MEDROXYPROGESTERONE ACETATE 10 MG/1
10 TABLET ORAL DAILY
Qty: 7 TABLET | Refills: 0 | Status: SHIPPED | OUTPATIENT
Start: 2023-11-03

## 2023-11-07 ENCOUNTER — HOSPITAL ENCOUNTER (EMERGENCY)
Facility: HOSPITAL | Age: 58
Discharge: HOME | End: 2023-11-08
Attending: EMERGENCY MEDICINE
Payer: COMMERCIAL

## 2023-11-07 DIAGNOSIS — K57.92 ACUTE DIVERTICULITIS: Primary | ICD-10-CM

## 2023-11-07 LAB
ALBUMIN SERPL-MCNC: 3.6 G/DL (ref 3.4–5)
ALBUMIN/GLOB SERPL: 0.8 {RATIO} (ref 1–2)
ALP LIVER SERPL-CCNC: 80 U/L
ALT SERPL-CCNC: 27 U/L
ANION GAP SERPL CALC-SCNC: 7 MMOL/L (ref 0–18)
AST SERPL-CCNC: 16 U/L (ref 15–37)
BASOPHILS # BLD AUTO: 0.04 X10(3) UL (ref 0–0.2)
BASOPHILS NFR BLD AUTO: 0.4 %
BILIRUB SERPL-MCNC: 0.3 MG/DL (ref 0.1–2)
BILIRUB UR QL STRIP.AUTO: NEGATIVE
BUN BLD-MCNC: 15 MG/DL (ref 9–23)
CALCIUM BLD-MCNC: 10 MG/DL (ref 8.5–10.1)
CHLORIDE SERPL-SCNC: 104 MMOL/L (ref 98–112)
CLARITY UR REFRACT.AUTO: CLEAR
CO2 SERPL-SCNC: 24 MMOL/L (ref 21–32)
CREAT BLD-MCNC: 0.97 MG/DL
EGFRCR SERPLBLD CKD-EPI 2021: 67 ML/MIN/1.73M2 (ref 60–?)
EOSINOPHIL # BLD AUTO: 0.06 X10(3) UL (ref 0–0.7)
EOSINOPHIL NFR BLD AUTO: 0.6 %
ERYTHROCYTE [DISTWIDTH] IN BLOOD BY AUTOMATED COUNT: 15.9 %
GLOBULIN PLAS-MCNC: 4.4 G/DL (ref 2.8–4.4)
GLUCOSE BLD-MCNC: 122 MG/DL (ref 70–99)
GLUCOSE UR STRIP.AUTO-MCNC: NORMAL MG/DL
HCT VFR BLD AUTO: 38.6 %
HGB BLD-MCNC: 12.9 G/DL
IMM GRANULOCYTES # BLD AUTO: 0.04 X10(3) UL (ref 0–1)
IMM GRANULOCYTES NFR BLD: 0.4 %
LEUKOCYTE ESTERASE UR QL STRIP.AUTO: NEGATIVE
LIPASE SERPL-CCNC: 64 U/L (ref 13–75)
LYMPHOCYTES # BLD AUTO: 1.7 X10(3) UL (ref 1–4)
LYMPHOCYTES NFR BLD AUTO: 15.8 %
MCH RBC QN AUTO: 28 PG (ref 26–34)
MCHC RBC AUTO-ENTMCNC: 33.4 G/DL (ref 31–37)
MCV RBC AUTO: 83.9 FL
MONOCYTES # BLD AUTO: 0.94 X10(3) UL (ref 0.1–1)
MONOCYTES NFR BLD AUTO: 8.7 %
NEUTROPHILS # BLD AUTO: 7.97 X10 (3) UL (ref 1.5–7.7)
NEUTROPHILS # BLD AUTO: 7.97 X10(3) UL (ref 1.5–7.7)
NEUTROPHILS NFR BLD AUTO: 74.1 %
NITRITE UR QL STRIP.AUTO: NEGATIVE
OSMOLALITY SERPL CALC.SUM OF ELEC: 282 MOSM/KG (ref 275–295)
PH UR STRIP.AUTO: 7 [PH] (ref 5–8)
PLATELET # BLD AUTO: 355 10(3)UL (ref 150–450)
POTASSIUM SERPL-SCNC: 3.8 MMOL/L (ref 3.5–5.1)
PROT SERPL-MCNC: 8 G/DL (ref 6.4–8.2)
PROT UR STRIP.AUTO-MCNC: NEGATIVE MG/DL
RBC # BLD AUTO: 4.6 X10(6)UL
SODIUM SERPL-SCNC: 135 MMOL/L (ref 136–145)
SP GR UR STRIP.AUTO: 1.02 (ref 1–1.03)
UROBILINOGEN UR STRIP.AUTO-MCNC: NORMAL MG/DL
WBC # BLD AUTO: 10.8 X10(3) UL (ref 4–11)

## 2023-11-07 PROCEDURE — 83690 ASSAY OF LIPASE: CPT

## 2023-11-07 PROCEDURE — 96374 THER/PROPH/DIAG INJ IV PUSH: CPT

## 2023-11-07 PROCEDURE — 83690 ASSAY OF LIPASE: CPT | Performed by: EMERGENCY MEDICINE

## 2023-11-07 PROCEDURE — 99285 EMERGENCY DEPT VISIT HI MDM: CPT

## 2023-11-07 PROCEDURE — 36415 COLL VENOUS BLD VENIPUNCTURE: CPT

## 2023-11-07 PROCEDURE — 80053 COMPREHEN METABOLIC PANEL: CPT

## 2023-11-07 PROCEDURE — 85025 COMPLETE CBC W/AUTO DIFF WBC: CPT | Performed by: EMERGENCY MEDICINE

## 2023-11-07 PROCEDURE — 80053 COMPREHEN METABOLIC PANEL: CPT | Performed by: EMERGENCY MEDICINE

## 2023-11-07 PROCEDURE — 81001 URINALYSIS AUTO W/SCOPE: CPT | Performed by: EMERGENCY MEDICINE

## 2023-11-07 PROCEDURE — 99284 EMERGENCY DEPT VISIT MOD MDM: CPT

## 2023-11-07 PROCEDURE — 85025 COMPLETE CBC W/AUTO DIFF WBC: CPT

## 2023-11-07 PROCEDURE — 96375 TX/PRO/DX INJ NEW DRUG ADDON: CPT

## 2023-11-07 RX ORDER — HYDROMORPHONE HYDROCHLORIDE 1 MG/ML
1 INJECTION, SOLUTION INTRAMUSCULAR; INTRAVENOUS; SUBCUTANEOUS ONCE
Status: COMPLETED | OUTPATIENT
Start: 2023-11-07 | End: 2023-11-08

## 2023-11-07 RX ORDER — ONDANSETRON 2 MG/ML
4 INJECTION INTRAMUSCULAR; INTRAVENOUS ONCE
Status: COMPLETED | OUTPATIENT
Start: 2023-11-07 | End: 2023-11-08

## 2023-11-08 ENCOUNTER — APPOINTMENT (OUTPATIENT)
Dept: CT IMAGING | Facility: HOSPITAL | Age: 58
End: 2023-11-08
Attending: EMERGENCY MEDICINE
Payer: COMMERCIAL

## 2023-11-08 VITALS
HEIGHT: 61 IN | OXYGEN SATURATION: 99 % | BODY MASS INDEX: 25.49 KG/M2 | TEMPERATURE: 99 F | SYSTOLIC BLOOD PRESSURE: 125 MMHG | WEIGHT: 135 LBS | RESPIRATION RATE: 20 BRPM | HEART RATE: 84 BPM | DIASTOLIC BLOOD PRESSURE: 59 MMHG

## 2023-11-08 LAB — LACTATE SERPL-SCNC: 0.9 MMOL/L (ref 0.4–2)

## 2023-11-08 PROCEDURE — 83605 ASSAY OF LACTIC ACID: CPT | Performed by: EMERGENCY MEDICINE

## 2023-11-08 PROCEDURE — 74177 CT ABD & PELVIS W/CONTRAST: CPT | Performed by: EMERGENCY MEDICINE

## 2023-11-08 PROCEDURE — 87040 BLOOD CULTURE FOR BACTERIA: CPT | Performed by: EMERGENCY MEDICINE

## 2023-11-08 RX ORDER — METRONIDAZOLE 500 MG/1
500 TABLET ORAL 3 TIMES DAILY
Qty: 30 TABLET | Refills: 0 | Status: SHIPPED | OUTPATIENT
Start: 2023-11-08 | End: 2023-11-18

## 2023-11-08 RX ORDER — HYDROCODONE BITARTRATE AND ACETAMINOPHEN 5; 325 MG/1; MG/1
1 TABLET ORAL EVERY 6 HOURS PRN
Qty: 20 TABLET | Refills: 0 | Status: SHIPPED | OUTPATIENT
Start: 2023-11-08

## 2023-11-08 RX ORDER — CIPROFLOXACIN 500 MG/1
500 TABLET, FILM COATED ORAL 2 TIMES DAILY
Qty: 20 TABLET | Refills: 0 | Status: SHIPPED | OUTPATIENT
Start: 2023-11-08 | End: 2023-11-18

## 2023-11-08 RX ORDER — METRONIDAZOLE 500 MG/1
500 TABLET ORAL ONCE
Status: COMPLETED | OUTPATIENT
Start: 2023-11-08 | End: 2023-11-08

## 2023-11-08 RX ORDER — CIPROFLOXACIN 2 MG/ML
400 INJECTION, SOLUTION INTRAVENOUS EVERY 12 HOURS
Status: DISCONTINUED | OUTPATIENT
Start: 2023-11-08 | End: 2023-11-08

## 2023-11-08 NOTE — ED INITIAL ASSESSMENT (HPI)
Reports she had vag bleeding for 5 days 2 weeks ago and saw her gynecologist who did a biopsy. States she was prescribed medroxyprogesterone. C/o severe menstrual type cramping that radiates through to her lower back. Denies any current vaginal bleeding. States she does have hx of diverticulitis but pain does not feel the same and she has been careful about what she eats.

## 2023-11-09 ENCOUNTER — TELEPHONE (OUTPATIENT)
Dept: OBGYN CLINIC | Facility: CLINIC | Age: 58
End: 2023-11-09

## 2023-11-09 ENCOUNTER — TELEPHONE (OUTPATIENT)
Dept: FAMILY MEDICINE CLINIC | Facility: CLINIC | Age: 58
End: 2023-11-09

## 2023-11-09 NOTE — TELEPHONE ENCOUNTER
Pt was in the ER for diverticulitis she took medication and rested all day, pain starting again hard to move back and forth, she is just laying on the couch now with a heating pad the pain is so bad. Please call.

## 2023-11-09 NOTE — TELEPHONE ENCOUNTER
Called [facility] in detail that the provider won't treat her without seeing her and we have no openings this week. So if the pain remains severe she should go to the urgent care to be seen for follow up.

## 2023-11-09 NOTE — TELEPHONE ENCOUNTER
Talked to the patient (see ER notes) she is using the hydrocodone but it is not controlling the pain. She has a heating pad she is using, I suggested she take 2 Advil every 6 hours for pain and keep her diet light. She is asking for something else for the pain.

## 2023-11-09 NOTE — TELEPHONE ENCOUNTER
I have not seen her in over a year and has not been in our office since February. Cannot manage pain medications w/o visit. If her pain is that severe, further evaluation is indicated anyway. Could try UC, likely shorter wait time.

## 2023-11-09 NOTE — TELEPHONE ENCOUNTER
Pt called. She was started on Provera 10 mg x 7 days on 11/2/23. She took 5 tablets woke up with severe abdominal pain Tuesday and went to the ER. She was diagnosed with acute diverticulitis. She was told to stop the Provera and was started on antibiotics. She had some light bleeding & cramping this morning and wanted to know if that is normal after the Provera. I let her know she can have bleeding 2-10 days after stopping the Provera so not worrisome. To call if heavy bleeding or worsening abdominal pain. I let her know I would get a message to Jaimee Robertson to review and call if any recommendations. She is scheduling an ER follow up with her PCP. Verbalized understanding.

## 2023-11-14 NOTE — TELEPHONE ENCOUNTER
Pt is waiting on further recommendations following her conversation with a nurse on 11/09/2023. Specifically, she needs to know if she should go back to taking estradiol and progesterone.  Samantha review and advise

## 2023-11-15 NOTE — TELEPHONE ENCOUNTER
Spoke with pt. Aware it is up to her if she wants to continue estradiol & progesterone. She will monitor for now and call if any bleeding or spotting. Verbalized understanding.

## 2023-11-20 ENCOUNTER — HOSPITAL ENCOUNTER (OUTPATIENT)
Dept: MAMMOGRAPHY | Age: 58
Discharge: HOME | End: 2023-11-20
Payer: COMMERCIAL

## 2023-11-20 DIAGNOSIS — Z12.31 BREAST CANCER SCREENING BY MAMMOGRAM: ICD-10-CM

## 2023-11-20 PROCEDURE — 77067 SCR MAMMO BI INCL CAD: CPT

## 2023-11-20 PROCEDURE — 77063 BREAST TOMOSYNTHESIS BI: CPT

## 2023-12-05 ENCOUNTER — HOSPITAL ENCOUNTER (OUTPATIENT)
Dept: MAMMOGRAPHY | Facility: HOSPITAL | Age: 58
Discharge: HOME | End: 2023-12-05
Payer: COMMERCIAL

## 2023-12-05 DIAGNOSIS — R92.2 INCONCLUSIVE MAMMOGRAM: ICD-10-CM

## 2023-12-05 PROCEDURE — 77065 DX MAMMO INCL CAD UNI: CPT

## 2023-12-05 PROCEDURE — 76642 ULTRASOUND BREAST LIMITED: CPT

## 2023-12-05 PROCEDURE — 77061 BREAST TOMOSYNTHESIS UNI: CPT

## 2024-04-03 ENCOUNTER — TELEPHONE (OUTPATIENT)
Dept: FAMILY MEDICINE CLINIC | Facility: CLINIC | Age: 59
End: 2024-04-03

## 2024-04-03 DIAGNOSIS — Z00.00 LABORATORY EXAMINATION ORDERED AS PART OF A COMPLETE PHYSICAL EXAMINATION: Primary | ICD-10-CM

## 2024-04-03 NOTE — TELEPHONE ENCOUNTER
Please enter lab orders for the patient's upcoming physical appointment.     Physical scheduled:   Your appointments       Date & Time Appointment Department ([place])    Apr 09, 2024 10:15 AM CDT Adult Physical with Jr Barrow PA [facility] ([facility])    PLEASE NOTE - Most insurances allow a Complete Physical once every 366 days. Please schedule accordingly.    Please arrive 15 minutes prior to your scheduled appointment. Please also bring your Insurance card, Photo ID, and your medication bottles or a list of your current medication.    If you no longer require this appointment, please contact your physician office to cancel.              [facility] [address]  [address] [address]  [phone]           Preferred lab: [facility] ([facility])     The patient has been notified to complete fasting labs prior to their physical appointment.

## 2024-04-08 ENCOUNTER — LAB ENCOUNTER (OUTPATIENT)
Dept: LAB | Age: 59
End: 2024-04-08
Attending: PHYSICIAN ASSISTANT
Payer: COMMERCIAL

## 2024-04-08 DIAGNOSIS — Z00.00 LABORATORY EXAMINATION ORDERED AS PART OF A COMPLETE PHYSICAL EXAMINATION: ICD-10-CM

## 2024-04-08 LAB
ALBUMIN SERPL-MCNC: 3.8 G/DL (ref 3.4–5)
ALBUMIN/GLOB SERPL: 1 {RATIO} (ref 1–2)
ALP LIVER SERPL-CCNC: 95 U/L
ALT SERPL-CCNC: 26 U/L
ANION GAP SERPL CALC-SCNC: 5 MMOL/L (ref 0–18)
AST SERPL-CCNC: 13 U/L (ref 15–37)
BASOPHILS # BLD AUTO: 0.04 X10(3) UL (ref 0–0.2)
BASOPHILS NFR BLD AUTO: 0.8 %
BILIRUB SERPL-MCNC: 0.4 MG/DL (ref 0.1–2)
BUN BLD-MCNC: 16 MG/DL (ref 9–23)
CALCIUM BLD-MCNC: 9.7 MG/DL (ref 8.5–10.1)
CHLORIDE SERPL-SCNC: 108 MMOL/L (ref 98–112)
CHOLEST SERPL-MCNC: 247 MG/DL (ref ?–200)
CO2 SERPL-SCNC: 27 MMOL/L (ref 21–32)
CREAT BLD-MCNC: 0.89 MG/DL
EGFRCR SERPLBLD CKD-EPI 2021: 74 ML/MIN/1.73M2 (ref 60–?)
EOSINOPHIL # BLD AUTO: 0.26 X10(3) UL (ref 0–0.7)
EOSINOPHIL NFR BLD AUTO: 4.9 %
ERYTHROCYTE [DISTWIDTH] IN BLOOD BY AUTOMATED COUNT: 14.4 %
FASTING PATIENT LIPID ANSWER: YES
FASTING STATUS PATIENT QL REPORTED: YES
GLOBULIN PLAS-MCNC: 3.9 G/DL (ref 2.8–4.4)
GLUCOSE BLD-MCNC: 87 MG/DL (ref 70–99)
HCT VFR BLD AUTO: 44.4 %
HDLC SERPL-MCNC: 105 MG/DL (ref 40–59)
HGB BLD-MCNC: 13.9 G/DL
IMM GRANULOCYTES # BLD AUTO: 0.01 X10(3) UL (ref 0–1)
IMM GRANULOCYTES NFR BLD: 0.2 %
LDLC SERPL CALC-MCNC: 128 MG/DL (ref ?–100)
LYMPHOCYTES # BLD AUTO: 1.27 X10(3) UL (ref 1–4)
LYMPHOCYTES NFR BLD AUTO: 23.9 %
MCH RBC QN AUTO: 30 PG (ref 26–34)
MCHC RBC AUTO-ENTMCNC: 31.3 G/DL (ref 31–37)
MCV RBC AUTO: 95.9 FL
MONOCYTES # BLD AUTO: 0.6 X10(3) UL (ref 0.1–1)
MONOCYTES NFR BLD AUTO: 11.3 %
NEUTROPHILS # BLD AUTO: 3.13 X10 (3) UL (ref 1.5–7.7)
NEUTROPHILS # BLD AUTO: 3.13 X10(3) UL (ref 1.5–7.7)
NEUTROPHILS NFR BLD AUTO: 58.9 %
NONHDLC SERPL-MCNC: 142 MG/DL (ref ?–130)
OSMOLALITY SERPL CALC.SUM OF ELEC: 291 MOSM/KG (ref 275–295)
PLATELET # BLD AUTO: 362 10(3)UL (ref 150–450)
POTASSIUM SERPL-SCNC: 3.9 MMOL/L (ref 3.5–5.1)
PROT SERPL-MCNC: 7.7 G/DL (ref 6.4–8.2)
RBC # BLD AUTO: 4.63 X10(6)UL
SODIUM SERPL-SCNC: 140 MMOL/L (ref 136–145)
T4 FREE SERPL-MCNC: 0.8 NG/DL (ref 0.8–1.7)
TRIGL SERPL-MCNC: 84 MG/DL (ref 30–149)
TSI SER-ACNC: 5.42 MIU/ML (ref 0.36–3.74)
VLDLC SERPL CALC-MCNC: 15 MG/DL (ref 0–30)
WBC # BLD AUTO: 5.3 X10(3) UL (ref 4–11)

## 2024-04-08 PROCEDURE — 80050 GENERAL HEALTH PANEL: CPT | Performed by: PHYSICIAN ASSISTANT

## 2024-04-08 PROCEDURE — 80061 LIPID PANEL: CPT | Performed by: PHYSICIAN ASSISTANT

## 2024-04-08 PROCEDURE — 84439 ASSAY OF FREE THYROXINE: CPT | Performed by: PHYSICIAN ASSISTANT

## 2024-04-09 ENCOUNTER — OFFICE VISIT (OUTPATIENT)
Dept: FAMILY MEDICINE CLINIC | Facility: CLINIC | Age: 59
End: 2024-04-09
Payer: COMMERCIAL

## 2024-04-09 VITALS
TEMPERATURE: 98 F | RESPIRATION RATE: 16 BRPM | DIASTOLIC BLOOD PRESSURE: 90 MMHG | OXYGEN SATURATION: 99 % | WEIGHT: 142 LBS | HEIGHT: 60.2 IN | SYSTOLIC BLOOD PRESSURE: 130 MMHG | BODY MASS INDEX: 27.52 KG/M2 | HEART RATE: 80 BPM

## 2024-04-09 DIAGNOSIS — N95.9 MENOPAUSAL AND POSTMENOPAUSAL DISORDER: ICD-10-CM

## 2024-04-09 DIAGNOSIS — R03.0 ELEVATED BLOOD PRESSURE READING WITHOUT DIAGNOSIS OF HYPERTENSION: ICD-10-CM

## 2024-04-09 DIAGNOSIS — E03.8 SUBCLINICAL HYPOTHYROIDISM: ICD-10-CM

## 2024-04-09 DIAGNOSIS — Z00.00 ANNUAL PHYSICAL EXAM: Primary | ICD-10-CM

## 2024-04-09 RX ORDER — FLUOXETINE 10 MG/1
10 CAPSULE ORAL DAILY
Qty: 90 CAPSULE | Refills: 0 | Status: SHIPPED | OUTPATIENT
Start: 2024-04-09

## 2024-04-09 NOTE — PROGRESS NOTES
DATE OF EXAM  4/9/2024    CHIEF COMPLAINT/REASON FOR VISIT  Annual exam.    HISTORY OF PRESENT ILLNESS  Dennise Schmitz presents today for routine annual physical examination.      - Hemifacial spasm- microvascular decompression surgery 3/9/23, immediate improvement.   - Postmenopausal bleeding- bx, pelvic US, and provera x 7 days. Continues to have hot flashes, weight gain. Feels that she has been really struggling without the hormones.   - Diverticulitis back in November 2023.       Current Outpatient Medications on File Prior to Visit   Medication Sig Dispense Refill    ferrous sulfate 325 (65 FE) MG Oral Tab EC Take 1 tablet (325 mg total) by mouth daily with breakfast.      Multiple Vitamin (MULTIVITAMIN ADULT OR) Take 1 tablet by mouth daily.      Probiotic Product (ALIGN) 4 MG Oral Cap Take 1 capsule by mouth daily.      Turmeric 500 MG Oral Tab Take 1 tablet by mouth daily.       No current facility-administered medications on file prior to visit.     Allergies:  Penicillins and Beeswax     Patient Active Problem List   Diagnosis    Fibroadenoma of right breast    Family history of thyroid disease    Facial twitching    Facial weakness    Postmenopausal bleeding    CMC DJD(carpometacarpal degenerative joint disease), localized primary    HNP (herniated nucleus pulposus), cervical    Diverticulosis of colon    Internal hemorrhoids       Past Surgical History:   Procedure Laterality Date    Colonoscopy      Create eardrum opening,gen anesth  8-10 yrs    left ear, replaced 2x    Jose biopsy stereo nodule 1 site right (cpt=19081)  1/2012    benign    Other surgical history  12/30/14    cervical dissectomy C5-C6    Other surgical history  2014    rt thumb    Other surgical history  2013    foot    Upper gi endoscopy,exam  4 yrs    Us breast biopsy 1 site right (cpt=19083)  2012    BENIGN       Social History     Socioeconomic History    Marital status:    Occupational History    Occupation: Homemaker    Tobacco Use    Smoking status: Never    Smokeless tobacco: Never   Vaping Use    Vaping status: Never Used   Substance and Sexual Activity    Alcohol use: Yes     Comment: 2 x /week    Drug use: No    Sexual activity: Yes     Partners: Male   Other Topics Concern    Caffeine Concern Yes     Comment: Tea daily-4-5 cups    Exercise Yes     Comment: walks 4-5 times daily and biking    Seat Belt Yes    Special Diet No    Stress Concern No    Weight Concern No    Self-Exams No       Family History   Problem Relation Age of Onset    Breast Cancer Maternal Grandmother 75    Arthritis Maternal Grandmother     Thyroid disease Father     Heart Disorder Father         pacemaker    Thyroid disease Mother     Musculo-skelatal Disorder Mother         knee replacement    Heart Disorder Mother         pacemaker    Diabetes Paternal Grandmother     No Known Problems Brother     No Known Problems Brother     No Known Problems Brother     No Known Problems Daughter     No Known Problems Daughter        Health maintenance:  Health Maintenance   Topic Date Due    Annual Physical  07/26/2023    COVID-19 Vaccine (4 - 2023-24 season) 09/01/2023    HTN: BP Follow-Up  05/09/2024    Influenza Vaccine (Season Ended) 10/01/2024    Mammogram  12/05/2024    Pap Smear  09/07/2025    Colorectal Cancer Screening  04/13/2031    DTaP,Tdap,and Td Vaccines (2 - Td or Tdap) 10/30/2033    Annual Depression Screening  Completed    Zoster Vaccines  Completed    Pneumococcal Vaccine: Birth to 64yrs  Aged Out         REVIEW OF SYSTEMS  As above.      PHYSICAL EXAMINATION  Blood pressure 130/90, pulse 80, temperature 97.5 °F (36.4 °C), resp. rate 16, height 5' 0.2\" (1.529 m), weight 142 lb (64.4 kg), last menstrual period 05/06/2014, SpO2 99%, not currently breastfeeding.   Body mass index is 27.55 kg/m².    GENERAL: Well-nourished, well-developed 58 year old year old in no apparent distress.  Awake, alert, age appropriate.  SKIN:  Warm, pink, and dry.  No  rashes, excoriations, open areas.  HEENT: Head is normocephalic, atraumatic.  Bilateral pupils are equal, reactive to light, and accommodation. EOMs intact. Conjunctivae and sclera are clear.  Bilateral external ears nontender to manipulation with clear canals. Bilateral TMs normal.    Hearing is grossly normal.  Nares patent with normal mucosa.   Oropharynx pink and moist without exudate or erythema.  NECK: Supple without lymphadenopathy.  CARDIOVASCULAR: Regular rate and rhythm with no murmurs, rubs, or gallops.  LUNGS: Normal effort on room air.  Clear to auscultation throughout.  ABDOMEN:  Bowel sounds present throughout. Rounded, soft, without tenderness to palpation with no organomegaly.  MUSCULOSKELETAL: Normal active range of motion in all extremities. 5/5 strength in upper and lower extremities, equal bilaterally.   NEUROLOGIC: Alert and oriented x 3.   PSYCH: Mood and affect normal.    IMPRESSION/REPORT/PLAN    1.Routine physical examination:  Patient is doing well overall. Discussed age appropriate health topics including: regular exercise, balanced diet, sunscreen, monitoring moles, adequate calcium and vitamin D intake. Health maintenance is up to date as shown above.  Recommend complete physical exams every year.    2. Subclinical hypothyroidism  Recheck TSH in 3 months. Avoid biotin 1 wk prior to recheck. No treatment at this time.   - TSH W Reflex To Free T4 [E]; Future    3. Menopausal and postmenopausal disorder  Agree with avoiding hormones given the postmenopausal bleeding. Negative evaluation per gynecology. Recommend that she consider fluoxetine. Start at 10 mg daily. Follow up if she feels that she would benefit from higher dose.    4. Elevated BP without dx of hypertension  Diastolic a bit elevated today on multiple reads. Historically has been normal. Recommend checking home BP readings and sending us results.    Patient expresses understanding and agreement with the above plan.   Jr  Danial LOAIZA

## 2024-07-06 ENCOUNTER — APPOINTMENT (OUTPATIENT)
Dept: CT IMAGING | Facility: HOSPITAL | Age: 59
End: 2024-07-06
Attending: EMERGENCY MEDICINE
Payer: COMMERCIAL

## 2024-07-06 ENCOUNTER — HOSPITAL ENCOUNTER (EMERGENCY)
Facility: HOSPITAL | Age: 59
Discharge: HOME | End: 2024-07-06
Attending: EMERGENCY MEDICINE
Payer: COMMERCIAL

## 2024-07-06 VITALS
OXYGEN SATURATION: 98 % | HEIGHT: 61 IN | SYSTOLIC BLOOD PRESSURE: 168 MMHG | TEMPERATURE: 98 F | HEART RATE: 75 BPM | WEIGHT: 140 LBS | DIASTOLIC BLOOD PRESSURE: 70 MMHG | RESPIRATION RATE: 20 BRPM | BODY MASS INDEX: 26.43 KG/M2

## 2024-07-06 DIAGNOSIS — K57.92 ACUTE DIVERTICULITIS: Primary | ICD-10-CM

## 2024-07-06 LAB
ALBUMIN SERPL-MCNC: 4 G/DL (ref 3.4–5)
ALBUMIN/GLOB SERPL: 1 {RATIO} (ref 1–2)
ALP LIVER SERPL-CCNC: 119 U/L
ALT SERPL-CCNC: 29 U/L
ANION GAP SERPL CALC-SCNC: 9 MMOL/L (ref 0–18)
AST SERPL-CCNC: 16 U/L (ref 15–37)
BASOPHILS # BLD AUTO: 0.04 X10(3) UL (ref 0–0.2)
BASOPHILS NFR BLD AUTO: 0.5 %
BILIRUB SERPL-MCNC: 0.6 MG/DL (ref 0.1–2)
BILIRUB UR QL STRIP.AUTO: NEGATIVE
BUN BLD-MCNC: 8 MG/DL (ref 9–23)
CALCIUM BLD-MCNC: 9.7 MG/DL (ref 8.5–10.1)
CHLORIDE SERPL-SCNC: 102 MMOL/L (ref 98–112)
CLARITY UR REFRACT.AUTO: CLEAR
CO2 SERPL-SCNC: 25 MMOL/L (ref 21–32)
CREAT BLD-MCNC: 0.88 MG/DL
EGFRCR SERPLBLD CKD-EPI 2021: 75 ML/MIN/1.73M2 (ref 60–?)
EOSINOPHIL # BLD AUTO: 0.18 X10(3) UL (ref 0–0.7)
EOSINOPHIL NFR BLD AUTO: 2.2 %
ERYTHROCYTE [DISTWIDTH] IN BLOOD BY AUTOMATED COUNT: 14.5 %
GLOBULIN PLAS-MCNC: 4 G/DL (ref 2.8–4.4)
GLUCOSE BLD-MCNC: 93 MG/DL (ref 70–99)
GLUCOSE UR STRIP.AUTO-MCNC: NORMAL MG/DL
HCT VFR BLD AUTO: 41.3 %
HGB BLD-MCNC: 14.4 G/DL
IMM GRANULOCYTES # BLD AUTO: 0.03 X10(3) UL (ref 0–1)
IMM GRANULOCYTES NFR BLD: 0.4 %
KETONES UR STRIP.AUTO-MCNC: 10 MG/DL
LEUKOCYTE ESTERASE UR QL STRIP.AUTO: NEGATIVE
LIPASE SERPL-CCNC: 49 U/L (ref 13–75)
LYMPHOCYTES # BLD AUTO: 1.05 X10(3) UL (ref 1–4)
LYMPHOCYTES NFR BLD AUTO: 13 %
MCH RBC QN AUTO: 30.6 PG (ref 26–34)
MCHC RBC AUTO-ENTMCNC: 34.9 G/DL (ref 31–37)
MCV RBC AUTO: 87.9 FL
MONOCYTES # BLD AUTO: 0.92 X10(3) UL (ref 0.1–1)
MONOCYTES NFR BLD AUTO: 11.4 %
NEUTROPHILS # BLD AUTO: 5.88 X10 (3) UL (ref 1.5–7.7)
NEUTROPHILS # BLD AUTO: 5.88 X10(3) UL (ref 1.5–7.7)
NEUTROPHILS NFR BLD AUTO: 72.5 %
NITRITE UR QL STRIP.AUTO: NEGATIVE
OSMOLALITY SERPL CALC.SUM OF ELEC: 280 MOSM/KG (ref 275–295)
PH UR STRIP.AUTO: 6 [PH] (ref 5–8)
PLATELET # BLD AUTO: 314 10(3)UL (ref 150–450)
POTASSIUM SERPL-SCNC: 3.9 MMOL/L (ref 3.5–5.1)
PROT SERPL-MCNC: 8 G/DL (ref 6.4–8.2)
PROT UR STRIP.AUTO-MCNC: NEGATIVE MG/DL
RBC # BLD AUTO: 4.7 X10(6)UL
RBC UR QL AUTO: NEGATIVE
SODIUM SERPL-SCNC: 136 MMOL/L (ref 136–145)
SP GR UR STRIP.AUTO: 1.01 (ref 1–1.03)
UROBILINOGEN UR STRIP.AUTO-MCNC: NORMAL MG/DL
WBC # BLD AUTO: 8.1 X10(3) UL (ref 4–11)

## 2024-07-06 PROCEDURE — 96361 HYDRATE IV INFUSION ADD-ON: CPT

## 2024-07-06 PROCEDURE — 85025 COMPLETE CBC W/AUTO DIFF WBC: CPT | Performed by: EMERGENCY MEDICINE

## 2024-07-06 PROCEDURE — 83690 ASSAY OF LIPASE: CPT | Performed by: EMERGENCY MEDICINE

## 2024-07-06 PROCEDURE — 80053 COMPREHEN METABOLIC PANEL: CPT

## 2024-07-06 PROCEDURE — 96374 THER/PROPH/DIAG INJ IV PUSH: CPT

## 2024-07-06 PROCEDURE — 99285 EMERGENCY DEPT VISIT HI MDM: CPT

## 2024-07-06 PROCEDURE — 99284 EMERGENCY DEPT VISIT MOD MDM: CPT

## 2024-07-06 PROCEDURE — 81003 URINALYSIS AUTO W/O SCOPE: CPT | Performed by: EMERGENCY MEDICINE

## 2024-07-06 PROCEDURE — 85025 COMPLETE CBC W/AUTO DIFF WBC: CPT

## 2024-07-06 PROCEDURE — 74177 CT ABD & PELVIS W/CONTRAST: CPT | Performed by: EMERGENCY MEDICINE

## 2024-07-06 PROCEDURE — 80053 COMPREHEN METABOLIC PANEL: CPT | Performed by: EMERGENCY MEDICINE

## 2024-07-06 PROCEDURE — 83690 ASSAY OF LIPASE: CPT

## 2024-07-06 RX ORDER — LEVOFLOXACIN 500 MG/1
500 TABLET, FILM COATED ORAL DAILY
Qty: 10 TABLET | Refills: 0 | Status: SHIPPED | OUTPATIENT
Start: 2024-07-06 | End: 2024-07-08

## 2024-07-06 RX ORDER — KETOROLAC TROMETHAMINE 15 MG/ML
15 INJECTION, SOLUTION INTRAMUSCULAR; INTRAVENOUS ONCE
Status: DISCONTINUED | OUTPATIENT
Start: 2024-07-06 | End: 2024-07-06

## 2024-07-06 RX ORDER — HYDROMORPHONE HYDROCHLORIDE 1 MG/ML
0.5 INJECTION, SOLUTION INTRAMUSCULAR; INTRAVENOUS; SUBCUTANEOUS ONCE
Status: COMPLETED | OUTPATIENT
Start: 2024-07-06 | End: 2024-07-06

## 2024-07-06 RX ORDER — METRONIDAZOLE 500 MG/1
500 TABLET ORAL 3 TIMES DAILY
Qty: 30 TABLET | Refills: 0 | Status: SHIPPED | OUTPATIENT
Start: 2024-07-06 | End: 2024-07-16

## 2024-07-06 RX ORDER — LEVOFLOXACIN 750 MG/1
750 TABLET, FILM COATED ORAL ONCE
Status: COMPLETED | OUTPATIENT
Start: 2024-07-06 | End: 2024-07-06

## 2024-07-06 RX ORDER — METRONIDAZOLE 500 MG/1
500 TABLET ORAL ONCE
Status: COMPLETED | OUTPATIENT
Start: 2024-07-06 | End: 2024-07-06

## 2024-07-07 NOTE — ED PROVIDER NOTES
Patient Seen in: [facility] Emergency Department      History     Chief Complaint   Patient presents with    Abdomen/Flank Pain     Stated Complaint: abd pain, hx of diverticulitis, feels like the start of it    Subjective:   HPI    59-year-old female presents to the emergency department for evaluation of left lower quad abdominal pain, symptoms started yesterday.  Patient states symptoms feel the same as previous episode of diverticulitis.  Denies any fevers or chills.  Denies urinary symptoms.  Denies nausea vomiting or diarrhea.  Denies melena or hematochezia.    Objective:   Past Medical History:    Anemia    Arthritis    Back pain    Bloating    Body piercing    Constipation    H/O bone scan    H/O mammogram    benign    Headache disorder    Hemifacial spasm of left side of face    IBS (irritable bowel syndrome)    Pain in joints    Pap smear for cervical cancer screening    negative    Sleep disturbance    Sprain of hand, unspecified site    Specify: palmar interosseous strain Log Date: 02/24/2012     Stress reaction of left foot, initial encounter    Ulcer    Wears glasses    Weight gain              Past Surgical History:   Procedure Laterality Date    Colonoscopy      Create eardrum opening,gen anesth  8-10 yrs    left ear, replaced 2x    Jose biopsy stereo nodule 1 site right (cpt=19081)  1/2012    benign    Other surgical history  12/30/14    cervical dissectomy C5-C6    Other surgical history  2014    rt thumb    Other surgical history  2013    foot    Upper gi endoscopy,exam  6 yrs    Us breast biopsy 1 site right (cpt=19083)  2012    BENIGN                Social History     Socioeconomic History    Marital status:    Occupational History    Occupation: Homemaker   Tobacco Use    Smoking status: Never    Smokeless tobacco: Never   Vaping Use    Vaping status: Never Used   Substance and Sexual Activity    Alcohol use: Yes     Comment: 2 x /week    Drug use: No    Sexual activity: Yes      Partners: Male   Other Topics Concern    Caffeine Concern Yes     Comment: Tea daily-4-5 cups    Exercise Yes     Comment: walks 4-5 times daily and biking    Seat Belt Yes    Special Diet No    Stress Concern No    Weight Concern No    Self-Exams No     Social Determinants of Health      [facility]    Housing Stability              Review of Systems    Positive for stated Chief Complaint: Abdomen/Flank Pain    Other systems are as noted in HPI.  Constitutional and vital signs reviewed.      All other systems reviewed and negative except as noted above.    Physical Exam     ED Triage Vitals   BP 07/06/24 1946 149/75   Pulse 07/06/24 1946 80   Resp 07/06/24 1946 18   Temp 07/06/24 1944 98.1 °F (36.7 °C)   Temp src 07/06/24 1944 Oral   SpO2 07/06/24 1946 98 %   O2 Device 07/06/24 1946 None (Room air)       Current Vitals:   Vital Signs  BP: (!) 168/7  Pulse: 75  Resp: 20  Temp: 98.1 °F (36.7 °C)  Temp src: Oral    Oxygen Therapy  SpO2: 98 %  O2 Device: None (Room air)            Physical Exam    GENERAL: Patient is awake, alert, , in no acute distress.  HEENT:  no scleral icterus.  Mucous membranes are moist  HEART: Regular rate and rhythm, no murmurs.  LUNGS: Clear to auscultation bilaterally.  No Rales, no rhonchi, no wheezing, no stridor.  ABDOMEN: Soft, nondistended, left lower quadrant tenderness, no rebound rigidity or guarding  EXTREMITIES: No peripheral edema      ED Course     Labs Reviewed   COMP METABOLIC PANEL (14) - Abnormal; Notable for the following components:       Result Value    BUN 8 (*)     All other components within normal limits   URINALYSIS WITH CULTURE REFLEX - Abnormal; Notable for the following components:    Ketones Urine 10 (*)     All other components within normal limits   LIPASE - Normal   CBC WITH DIFFERENTIAL WITH PLATELET    Narrative:     The following orders were created for panel order CBC With Differential With  Platelet.  Procedure                               Abnormality         Status                     ---------                               -----------         ------                     CBC W/ DIFFERENTIAL[[id number]]                              Final result                 Please view results for these tests on the individual orders.   CBC W/ DIFFERENTIAL                      MDM        Differential diagnosis before testing includes but not limited to diverticulitis, perforated viscus, intra-abdominal abscess, electrolyte abnormality which is a medical condition that poses a threat to life/function    Radiographic images  I personally reviewed the radiographs and my individual interpretation shows CT abdomen no free air  I also reviewed the official reports that showed acute diverticulitis of the sigmoid colon, no free air or abscess.      Course of Events during Emergency Room Visit include IV established, blood work obtained.  Patient was given IV fluid hydration, did receive Dilaudid for pain.  CBC and chemistry were unremarkable.  CT did reveal acute diverticulitis of sigmoid colon.  Reevaluation abdomen soft nonsurgical, I discussed all results with the patient.  Was given first dose of antibiotic in the emergency department, will prescribe Levaquin and Flagyl, may alternate ibuprofen and Tylenol at home, follow-up close with primary care return to ER if any change or worsening symptoms.  Patient agrees with plan will be calling her  for a ride home.    Shared decision making was utilized             Discharge  I have discussed with the patient the results of test, differential diagnosis, treatment plan, warning signs and symptoms which should prompt immediate return.  They expressed understanding of these instructions and agrees to the following plan provided.  They were given written discharge instructions and agrees to return for any concerns and voiced understanding and all questions were  answered.    Note to patient: The 21st Century Cures Act makes medical notes like these available to patients in the interest of transparency. However, this is a medical document intended as peer to peer communication. It is written in medical language and may contain abbreviations or verbiage that are unfamiliar. It may appear blunt or direct. Medical documents are intended to carry relevant information, facts as evident, and the clinical opinion of the practitioner.                                            Medical Decision Making      Disposition and Plan     Clinical Impression:  1. Acute diverticulitis         Disposition:  Discharge  7/6/2024 11:13 pm    Follow-up:  Edin Garcia MD  [address]  [facility] [address]  [phone]    Follow up in 2 day(s)            Medications Prescribed:  Current Discharge Medication List        START taking these medications    Details   levoFLOXacin 500 MG Oral Tab Take 1 tablet (500 mg total) by mouth daily for 10 days.  Qty: 10 tablet, Refills: 0      metRONIDAZOLE 500 MG Oral Tab Take 1 tablet (500 mg total) by mouth 3 (three) times daily for 10 days.  Qty: 30 tablet, Refills: 0

## 2024-07-08 ENCOUNTER — TELEPHONE (OUTPATIENT)
Dept: FAMILY MEDICINE CLINIC | Facility: CLINIC | Age: 59
End: 2024-07-08

## 2024-07-08 RX ORDER — CIPROFLOXACIN 500 MG/1
500 TABLET, FILM COATED ORAL 2 TIMES DAILY
Qty: 14 TABLET | Refills: 0 | Status: SHIPPED | OUTPATIENT
Start: 2024-07-08 | End: 2024-07-15

## 2024-07-08 NOTE — TELEPHONE ENCOUNTER
Called and talked to the patient and she took the med at 8:30 and then her calves felt tight radiating up her leg she is sure that the levofloxacin is what is causing this as the last time she had ciprofloxacin and she did not get the leg cramps. She has and appointment with Dr Garcia and would like to stop the levofloxacin and get ciprofloxacin instead as she has tolerated this in the past.

## 2024-07-08 NOTE — TELEPHONE ENCOUNTER
Yes, agree with stopping Levaquin and starting Cipro  Order placed  Will follow-up with her on Thursday

## 2024-07-08 NOTE — TELEPHONE ENCOUNTER
Patient is having side effect from medication is taking [facility] 7/06/2024  Felling hot, headache all day , legs tide.    levoFLOXacin 500 MG Oral Tab     Request a nurse to call back.

## 2024-07-09 ENCOUNTER — LAB ENCOUNTER (OUTPATIENT)
Dept: LAB | Age: 59
End: 2024-07-09
Attending: PHYSICIAN ASSISTANT
Payer: COMMERCIAL

## 2024-07-09 DIAGNOSIS — E03.8 SUBCLINICAL HYPOTHYROIDISM: ICD-10-CM

## 2024-07-09 LAB
T4 FREE SERPL-MCNC: 1.1 NG/DL (ref 0.8–1.7)
TSI SER-ACNC: 5.67 MIU/ML (ref 0.36–3.74)

## 2024-07-09 PROCEDURE — 84443 ASSAY THYROID STIM HORMONE: CPT | Performed by: PHYSICIAN ASSISTANT

## 2024-07-09 PROCEDURE — 84439 ASSAY OF FREE THYROXINE: CPT | Performed by: PHYSICIAN ASSISTANT

## 2024-07-11 ENCOUNTER — OFFICE VISIT (OUTPATIENT)
Dept: FAMILY MEDICINE CLINIC | Facility: CLINIC | Age: 59
End: 2024-07-11
Payer: COMMERCIAL

## 2024-07-11 VITALS
OXYGEN SATURATION: 97 % | SYSTOLIC BLOOD PRESSURE: 130 MMHG | DIASTOLIC BLOOD PRESSURE: 80 MMHG | WEIGHT: 145.38 LBS | RESPIRATION RATE: 16 BRPM | HEART RATE: 75 BPM | HEIGHT: 61 IN | BODY MASS INDEX: 27.45 KG/M2

## 2024-07-11 DIAGNOSIS — E03.8 SUBCLINICAL HYPOTHYROIDISM: ICD-10-CM

## 2024-07-11 DIAGNOSIS — Z83.49 FAMILY HISTORY OF THYROID DISEASE: ICD-10-CM

## 2024-07-11 DIAGNOSIS — R23.2 VASOMOTOR FLUSHING: ICD-10-CM

## 2024-07-11 DIAGNOSIS — K57.92 ACUTE DIVERTICULITIS: Primary | ICD-10-CM

## 2024-07-11 PROCEDURE — 3079F DIAST BP 80-89 MM HG: CPT | Performed by: FAMILY MEDICINE

## 2024-07-11 PROCEDURE — 3008F BODY MASS INDEX DOCD: CPT | Performed by: FAMILY MEDICINE

## 2024-07-11 PROCEDURE — 99214 OFFICE O/P EST MOD 30 MIN: CPT | Performed by: FAMILY MEDICINE

## 2024-07-11 PROCEDURE — 3075F SYST BP GE 130 - 139MM HG: CPT | Performed by: FAMILY MEDICINE

## 2024-07-11 NOTE — PROGRESS NOTES
Chief Complaint   Patient presents with    Medication Follow-Up     Patient here for mood medicine      HPI:   Dennise Schmitz is a 59 year old female who presents to the office for discussion of several ongoing issues    Moods - was on Prozac to help when taken off the hormones.  Did not really like being on the medicine.  Not sure she would like to stay on the medicines.  When she was taking it, she did not see a huge improvement.  Ran out of the med a few weeks ago.      GI - diverticulitis - recent flare up starting the 5th of July.  Put on Levaquin and flagyl, but caused issues.  Stopped Levaquin, put on Cipro earlier this week.  Following BRAT diet.  No solid BM for a week and a half.      Thyroid - last TSH slightly elevated at 5.6.  in April, similarly elevated.       - vaginal bleeding end of the year 2023.  Saw GYN.  Was on hormone therapy so stopped this.        REVIEW OF SYSTEMS:   Pertinent items are noted in HPI.  EXAM:   /80   Pulse 75   Resp 16   Ht 5' 1\" (1.549 m)   Wt 145 lb 6 oz (65.9 kg)   LMP 05/06/2014   SpO2 97%   BMI 27.47 kg/m²     General appearance - alert, well appearing, and in no distress  Neck - supple, no significant adenopathy  Chest - clear to auscultation, no wheezes, rales or rhonchi, symmetric air entry  Heart - normal rate, regular rhythm, normal S1, S2, no murmurs, rubs, clicks or gallops  Abdomen - soft, nontender, nondistended, no masses or organomegaly  Extremities - peripheral pulses normal, no pedal edema, no clubbing or cyanosis    Component      Latest Ref Rng 7/25/2022 4/8/2024 7/9/2024   TSH      0.358 - 3.740 mIU/mL 3.080  5.420 (H)  5.670 (H)    T4,Free (Direct)      0.8 - 1.7 ng/dL  0.8  1.1       Legend:  (H) High    CT abdomen: CONCLUSION:    1. There is acute diverticulitis of the sigmoid colon in left pelvis.  There is no free air or abscess.   2. Unilateral left spondylolysis at L5 is noted.       ASSESSMENT AND PLAN:     Dennise Pascual  Nadir was seen in the office today:  had concerns including Medication Follow-Up (Patient here for mood medicine).    1. Acute diverticulitis  On tail end of flare  Complete the antibiotics  Continue to watch diet, emphasis on avoiding constipation    2. Subclinical hypothyroidism  Labs remain slightly elevated, but no worsening  No overt symptoms of hypothyroidism  Will recheck TSH as well as the antithyroid labs  - Thyroid peroxidase & thyroglobulin ab [E]; Future  - TSH W Reflex To Free T4; Future    3. Family history of thyroid disease  Noted and family history    4. Vasomotor flushing  Was on hormones, but stopped due to postmenopausal bleeding  Changed over to fluoxetine which never seem to really help significantly  She weaned off this a couple weeks ago  Currently on nothing.  Will monitor        Edin Garcia M.D.   [facility] 3  07/11/24    Return for get the thyroid labs in 6-8 weeks. .

## 2024-10-24 ENCOUNTER — HOSPITAL ENCOUNTER (OUTPATIENT)
Dept: GENERAL RADIOLOGY | Facility: HOSPITAL | Age: 59
Discharge: HOME | End: 2024-10-24
Attending: INTERNAL MEDICINE
Payer: COMMERCIAL

## 2024-10-24 ENCOUNTER — EKG ENCOUNTER (OUTPATIENT)
Dept: LAB | Facility: HOSPITAL | Age: 59
End: 2024-10-24
Attending: FAMILY MEDICINE
Payer: COMMERCIAL

## 2024-10-24 DIAGNOSIS — Z01.818 PRE-OP EXAM: ICD-10-CM

## 2024-10-24 DIAGNOSIS — E03.8 SUBCLINICAL HYPOTHYROIDISM: ICD-10-CM

## 2024-10-24 DIAGNOSIS — S93.492D SPRAIN OF ANTERIOR TALOFIBULAR LIGAMENT OF LEFT ANKLE, SUBSEQUENT ENCOUNTER: ICD-10-CM

## 2024-10-24 DIAGNOSIS — Z01.818 PRE-OP EXAM: Primary | ICD-10-CM

## 2024-10-24 LAB
ALBUMIN SERPL-MCNC: 5.1 G/DL (ref 3.2–4.8)
ALBUMIN/GLOB SERPL: 1.5 {RATIO} (ref 1–2)
ALP LIVER SERPL-CCNC: 120 U/L
ALT SERPL-CCNC: 22 U/L
ANION GAP SERPL CALC-SCNC: 7 MMOL/L (ref 0–18)
APTT PPP: 29.4 SECONDS (ref 23–36)
AST SERPL-CCNC: 23 U/L (ref ?–34)
ATRIAL RATE: 72 BPM
B-HCG SERPL-ACNC: <2.6 MIU/ML
BASOPHILS # BLD AUTO: 0.04 X10(3) UL (ref 0–0.2)
BASOPHILS NFR BLD AUTO: 0.9 %
BILIRUB SERPL-MCNC: 0.5 MG/DL (ref 0.2–1.1)
BILIRUB UR QL STRIP.AUTO: NEGATIVE
BUN BLD-MCNC: 12 MG/DL (ref 9–23)
CALCIUM BLD-MCNC: 10.6 MG/DL (ref 8.7–10.4)
CHLORIDE SERPL-SCNC: 106 MMOL/L (ref 98–112)
CLARITY UR REFRACT.AUTO: CLEAR
CO2 SERPL-SCNC: 28 MMOL/L (ref 21–32)
CREAT BLD-MCNC: 0.83 MG/DL
EGFRCR SERPLBLD CKD-EPI 2021: 80 ML/MIN/1.73M2 (ref 60–?)
EOSINOPHIL # BLD AUTO: 0.13 X10(3) UL (ref 0–0.7)
EOSINOPHIL NFR BLD AUTO: 3 %
ERYTHROCYTE [DISTWIDTH] IN BLOOD BY AUTOMATED COUNT: 13.8 %
FASTING STATUS PATIENT QL REPORTED: YES
GLOBULIN PLAS-MCNC: 3.3 G/DL (ref 2–3.5)
GLUCOSE BLD-MCNC: 97 MG/DL (ref 70–99)
GLUCOSE UR STRIP.AUTO-MCNC: NORMAL MG/DL
HCT VFR BLD AUTO: 44 %
HGB BLD-MCNC: 14.7 G/DL
IMM GRANULOCYTES # BLD AUTO: 0.02 X10(3) UL (ref 0–1)
IMM GRANULOCYTES NFR BLD: 0.5 %
INR BLD: 0.95 (ref 0.8–1.2)
KETONES UR STRIP.AUTO-MCNC: NEGATIVE MG/DL
LEUKOCYTE ESTERASE UR QL STRIP.AUTO: NEGATIVE
LYMPHOCYTES # BLD AUTO: 0.94 X10(3) UL (ref 1–4)
LYMPHOCYTES NFR BLD AUTO: 21.5 %
MCH RBC QN AUTO: 30.6 PG (ref 26–34)
MCHC RBC AUTO-ENTMCNC: 33.4 G/DL (ref 31–37)
MCV RBC AUTO: 91.5 FL
MONOCYTES # BLD AUTO: 0.57 X10(3) UL (ref 0.1–1)
MONOCYTES NFR BLD AUTO: 13 %
NEUTROPHILS # BLD AUTO: 2.68 X10 (3) UL (ref 1.5–7.7)
NEUTROPHILS # BLD AUTO: 2.68 X10(3) UL (ref 1.5–7.7)
NEUTROPHILS NFR BLD AUTO: 61.1 %
NITRITE UR QL STRIP.AUTO: NEGATIVE
OSMOLALITY SERPL CALC.SUM OF ELEC: 292 MOSM/KG (ref 275–295)
P AXIS: 33 DEGREES
P-R INTERVAL: 154 MS
PH UR STRIP.AUTO: 6.5 [PH] (ref 5–8)
PLATELET # BLD AUTO: 361 10(3)UL (ref 150–450)
POTASSIUM SERPL-SCNC: 4 MMOL/L (ref 3.5–5.1)
PROT SERPL-MCNC: 8.4 G/DL (ref 5.7–8.2)
PROT UR STRIP.AUTO-MCNC: NEGATIVE MG/DL
PROTHROMBIN TIME: 12.8 SECONDS (ref 11.6–14.8)
Q-T INTERVAL: 398 MS
QRS DURATION: 82 MS
QTC CALCULATION (BEZET): 435 MS
R AXIS: -14 DEGREES
RBC # BLD AUTO: 4.81 X10(6)UL
RBC UR QL AUTO: NEGATIVE
SODIUM SERPL-SCNC: 141 MMOL/L (ref 136–145)
SP GR UR STRIP.AUTO: 1.02 (ref 1–1.03)
T AXIS: 17 DEGREES
THYROGLOB SERPL-MCNC: 72 U/ML (ref ?–60)
THYROPEROXIDASE AB SERPL-ACNC: 1378 U/ML (ref ?–60)
TSI SER-ACNC: 3.27 MIU/ML (ref 0.55–4.78)
UROBILINOGEN UR STRIP.AUTO-MCNC: NORMAL MG/DL
VENTRICULAR RATE: 72 BPM
WBC # BLD AUTO: 4.4 X10(3) UL (ref 4–11)

## 2024-10-24 PROCEDURE — 84443 ASSAY THYROID STIM HORMONE: CPT

## 2024-10-24 PROCEDURE — 81003 URINALYSIS AUTO W/O SCOPE: CPT

## 2024-10-24 PROCEDURE — 71046 X-RAY EXAM CHEST 2 VIEWS: CPT | Performed by: INTERNAL MEDICINE

## 2024-10-24 PROCEDURE — 93010 ELECTROCARDIOGRAM REPORT: CPT | Performed by: INTERNAL MEDICINE

## 2024-10-24 PROCEDURE — 93005 ELECTROCARDIOGRAM TRACING: CPT

## 2024-10-24 PROCEDURE — 85730 THROMBOPLASTIN TIME PARTIAL: CPT

## 2024-10-24 PROCEDURE — 80053 COMPREHEN METABOLIC PANEL: CPT

## 2024-10-24 PROCEDURE — 85025 COMPLETE CBC W/AUTO DIFF WBC: CPT

## 2024-10-24 PROCEDURE — 86800 THYROGLOBULIN ANTIBODY: CPT

## 2024-10-24 PROCEDURE — 86376 MICROSOMAL ANTIBODY EACH: CPT

## 2024-10-24 PROCEDURE — 84702 CHORIONIC GONADOTROPIN TEST: CPT

## 2024-10-24 PROCEDURE — 36415 COLL VENOUS BLD VENIPUNCTURE: CPT

## 2024-10-24 PROCEDURE — 85610 PROTHROMBIN TIME: CPT

## 2024-10-29 ENCOUNTER — TELEPHONE (OUTPATIENT)
Dept: FAMILY MEDICINE CLINIC | Facility: CLINIC | Age: 59
End: 2024-10-29

## 2024-10-29 DIAGNOSIS — R94.31 ABNORMAL ELECTROCARDIOGRAM (ECG) (EKG): Primary | ICD-10-CM

## 2024-10-29 NOTE — TELEPHONE ENCOUNTER
There are enough subtle changes on the EKG that we might be best having a cardiologist look it over prior to the surgery.  I would advise to go in that direction.

## 2024-10-29 NOTE — TELEPHONE ENCOUNTER
Jaskaran Capps MD  [address] [place] [id number]  [phone]  Called and gave information to patient and she will denilson to schedule for surgical clearance      No

## 2024-10-29 NOTE — TELEPHONE ENCOUNTER
Patient is having foot surgery tomorrow with Dr. Murray. When she had her EKG done there was an abnormality that they had seen. Patient states that she needs to have Dr. Garcia review it and sign off as our office ordered the last one that was done and she doesn't seem to think that there is any concern. Patient is needing a clearance letter or she needs to get a 12-Lead EKG done.   If this letter can be completed, please fax to Dr. Murray's office at [phone]

## 2025-01-30 ENCOUNTER — OFFICE VISIT (OUTPATIENT)
Facility: CLINIC | Age: 60
End: 2025-01-30
Payer: COMMERCIAL

## 2025-01-30 VITALS
HEIGHT: 61 IN | WEIGHT: 155.81 LBS | SYSTOLIC BLOOD PRESSURE: 118 MMHG | HEART RATE: 99 BPM | DIASTOLIC BLOOD PRESSURE: 80 MMHG | BODY MASS INDEX: 29.42 KG/M2

## 2025-01-30 DIAGNOSIS — Z12.4 PAPANICOLAOU SMEAR FOR CERVICAL CANCER SCREENING: Primary | ICD-10-CM

## 2025-01-30 DIAGNOSIS — Z12.31 ENCOUNTER FOR SCREENING MAMMOGRAM FOR BREAST CANCER: ICD-10-CM

## 2025-01-30 PROCEDURE — 87624 HPV HI-RISK TYP POOLED RSLT: CPT | Performed by: OBSTETRICS & GYNECOLOGY

## 2025-01-30 PROCEDURE — 99396 PREV VISIT EST AGE 40-64: CPT | Performed by: OBSTETRICS & GYNECOLOGY

## 2025-01-30 NOTE — PROGRESS NOTES
Dennise Schmitz is a 59 year old female  Patient's last menstrual period was 2014.   Chief Complaint   Patient presents with    Wellness Visit    Other     Reviewed Preventative/Wellness form with patient    Gyn Problem     Discuss weight gain.   .     ***    OBSTETRICS HISTORY:  OB History    Para Term  AB Living   3 3 3     3   SAB IAB Ectopic Multiple Live Births           3      # Outcome Date GA Lbr Sd/2nd Weight Sex Type Anes PTL Lv   3 Term 01 40w0d   F NORMAL SPONT   VIRIDIANA   2 Term 99 40w0d   F NORMAL SPONT   VIRIDIANA   1 Term 06/10/96 40w0d   F NORMAL SPONT   VIRIDIANA       GYNE HISTORY:  Periods {EM Menses (multi):6156}    History   Sexual Activity    Sexual activity: Yes    Partners: Male        Pap Date: 22  Pap Result Notes: negative        MEDICAL HISTORY:  Past Medical History:    Anemia    Arthritis    Back pain    Bloating    Body piercing    Constipation    H/O bone scan    H/O mammogram    benign    Headache disorder    Hemifacial spasm of left side of face    IBS (irritable bowel syndrome)    Pain in joints    Pap smear for cervical cancer screening    negative    Sleep disturbance    Sprain of hand, unspecified site    Specify: palmar interosseous strain Log Date: 2012     Stress reaction of left foot, initial encounter    Ulcer    Wears glasses    Weight gain       SURGICAL HISTORY:  Past Surgical History:   Procedure Laterality Date    Colonoscopy      Create eardrum opening,gen anesth  8-10 yrs    left ear, replaced 2x    Jose biopsy stereo nodule 1 site right (cpt=19081)  2012    benign    Other surgical history  14    cervical dissectomy C5-C6    Other surgical history  2014    rt thumb    Other surgical history      foot    Upper gi endoscopy,exam  6 yrs    Us breast biopsy 1 site right (cpt=19083)      BENIGN       SOCIAL HISTORY:  Social History     Socioeconomic History    Marital status:      Spouse name: Not on file     Number of children: Not on file    Years of education: Not on file    Highest education level: Not on file   Occupational History    Occupation: Homemaker   Tobacco Use    Smoking status: Never    Smokeless tobacco: Never   Vaping Use    Vaping status: Never Used   Substance and Sexual Activity    Alcohol use: Yes     Comment: 2 x /week    Drug use: No    Sexual activity: Yes     Partners: Male   Other Topics Concern    Caffeine Concern Yes     Comment: Tea daily-4-5 cups    Exercise Yes     Comment: walks 4-5 times daily and biking    Seat Belt Yes    Special Diet No    Stress Concern No    Weight Concern No     Service Not Asked    Blood Transfusions Not Asked    Occupational Exposure Not Asked    Hobby Hazards Not Asked    Sleep Concern Not Asked    Back Care Not Asked    Bike Helmet Not Asked    Self-Exams No   Social History Narrative    Not on file     Social Drivers of Health     Financial Resource Strain: Not on file   Food Insecurity: Not on file   Transportation Needs: Not on file   Physical Activity: Not on file   Stress: Not on file   Social Connections: Not on file   Housing Stability: Low Risk  (9/2/2021)    [facility]    Housing Stability     Mortgage Payment Concerns?: Not on file     Number of Places Lived in the Last Year: Not on file     Unstable Housing?: Not on file       FAMILY HISTORY:  Family History   Problem Relation Age of Onset    Breast Cancer Maternal Grandmother 75    Arthritis Maternal Grandmother     Thyroid disease Father     Heart Disorder Father         pacemaker    Thyroid disease Mother     Musculo-skelatal Disorder Mother         knee replacement    Heart Disorder Mother         pacemaker    Diabetes Paternal Grandmother     No Known Problems Brother     No Known Problems Brother     No Known Problems Brother     No Known Problems Daughter     No Known Problems Daughter        MEDICATIONS:    Current  Outpatient Medications:     Multiple Vitamin (MULTIVITAMIN ADULT OR), Take 1 tablet by mouth daily., Disp: , Rfl:     Probiotic Product (ALIGN) 4 MG Oral Cap, Take 1 capsule by mouth daily., Disp: , Rfl:     Turmeric 500 MG Oral Tab, Take 1 tablet by mouth daily., Disp: , Rfl:     ferrous sulfate 325 (65 FE) MG Oral Tab EC, Take 1 tablet (325 mg total) by mouth daily with breakfast., Disp: , Rfl:     ALLERGIES:  Allergies[1]      Review of Systems:  Constitutional:  Denies fatigue, night sweats, hot flashes  Gastrointestinal:  denies heartburn, abdominal pain, diarrhea or constipation  Genitourinary:  denies dysuria, incontinence, abnormal vaginal discharge, vaginal itching  Skin/Breast:  Denies any breast pain, lumps, or discharge.   Neurological:  denies headaches, extremity weakness or numbness.      PHYSICAL EXAM:   Constitutional: well developed, well nourished  Head/Face: normocephalic  Neck/Thyroid: thyroid symmetric, no thyromegaly, no nodules, no adenopathy  Breast: normal without palpable masses, tenderness, asymmetry, nipple discharge, nipple retraction or skin changes  Abdomen:  soft, nontender, nondistended, no masses  Skin/Hair: no unusual rashes or bruises   Extremities: no edema, no cyanosis  Psychiatric:  Oriented to time, place, person and situation. Appropriate mood and affect    Pelvic Exam:  External Genitalia: normal appearance, hair distribution, and no lesions  Urethral Meatus:  normal in size, location, without lesions and prolapse  Bladder:  No fullness, masses or tenderness  Vagina:  Normal appearance without lesions, no abnormal discharge  Cervix:  Normal without tenderness on motion  Uterus: normal in size, contour, position, mobility, without tenderness  Adnexa: normal without masses or tenderness  Perineum: normal  Anus: no hemorroids     Assessment & Plan:  Dennise was seen today for wellness visit, other and gyn problem.    Diagnoses and all orders for this visit:    Papanicolaou smear  for cervical cancer screening  -     ThinPrep PAP with HPV Reflex Request B; Future    Encounter for screening mammogram for breast cancer  -     [facility]ILLE 2D+3D SCREENING BILAT (CPT=77067/82597); Future        ***               [1]   Allergies  Allergen Reactions    Penicillins OTHER (SEE COMMENTS)     Pt unsure  Other reaction(s): Rash/Hives/Urticaria    Beeswax OTHER (SEE COMMENTS)

## 2025-01-30 NOTE — PROGRESS NOTES
Dennise Schmitz is a 59 year old female  Patient's last menstrual period was 2014.   Chief Complaint   Patient presents with    Wellness Visit    Other     Reviewed Preventative/Wellness form with patient    Gyn Problem     Discuss weight gain.   .     No vaginal bleeding.  She had left ankle surgery and is still in physical therapy.  She complains of weight gain.  Blood work is done with her primary.  She had a colonoscopy about 2 years ago.  She takes vitamin D.  She complains of difficulty sleeping.  She is trying over-the-counter medicine she does not want a prescription at this time she has hot flashes still    OBSTETRICS HISTORY:  OB History    Para Term  AB Living   3 3 3     3   SAB IAB Ectopic Multiple Live Births           3      # Outcome Date GA Lbr Sd/2nd Weight Sex Type Anes PTL Lv   3 Term 01 40w0d   F NORMAL SPONT   VIRIDIANA   2 Term 99 40w0d   F NORMAL SPONT   VIRIDIANA   1 Term 06/10/96 40w0d   F NORMAL SPONT   VIRIDIANA       GYNE HISTORY:  Periods none due to menopause    History   Sexual Activity    Sexual activity: Yes    Partners: Male        Pap Date: 22  Pap Result Notes: negative        MEDICAL HISTORY:  Past Medical History:    Anemia    Arthritis    Back pain    Bloating    Body piercing    Constipation    H/O bone scan    H/O mammogram    benign    Headache disorder    Hemifacial spasm of left side of face    IBS (irritable bowel syndrome)    Pain in joints    Pap smear for cervical cancer screening    negative    Sleep disturbance    Sprain of hand, unspecified site    Specify: palmar interosseous strain Log Date: 2012     Stress reaction of left foot, initial encounter    Ulcer    Wears glasses    Weight gain       SURGICAL HISTORY:  Past Surgical History:   Procedure Laterality Date    Colonoscopy      Create eardrum opening,gen anesth  8-10 yrs    left ear, replaced 2x    Jose biopsy stereo nodule 1 site right (cpt=19081)  2012    benign     Other surgical history  12/30/14    cervical dissectomy C5-C6    Other surgical history  2014    rt thumb    Other surgical history  2013    foot    Upper gi endoscopy,exam  6 yrs    Us breast biopsy 1 site right (cpt=19083)  2012    BENIGN       SOCIAL HISTORY:  Social History     Socioeconomic History    Marital status:      Spouse name: Not on file    Number of children: Not on file    Years of education: Not on file    Highest education level: Not on file   Occupational History    Occupation: Homemaker   Tobacco Use    Smoking status: Never    Smokeless tobacco: Never   Vaping Use    Vaping status: Never Used   Substance and Sexual Activity    Alcohol use: Yes     Comment: 2 x /week    Drug use: No    Sexual activity: Yes     Partners: Male   Other Topics Concern    Caffeine Concern Yes     Comment: Tea daily-4-5 cups    Exercise Yes     Comment: walks 4-5 times daily and biking    Seat Belt Yes    Special Diet No    Stress Concern No    Weight Concern No     Service Not Asked    Blood Transfusions Not Asked    Occupational Exposure Not Asked    Hobby Hazards Not Asked    Sleep Concern Not Asked    Back Care Not Asked    Bike Helmet Not Asked    Self-Exams No   Social History Narrative    Not on file     Social Drivers of Health     Financial Resource Strain: Not on file   Food Insecurity: Not on file   Transportation Needs: Not on file   Physical Activity: Not on file   Stress: Not on file   Social Connections: Not on file   Housing Stability: Low Risk  (9/2/2021)    [facility]    Housing Stability     Mortgage Payment Concerns?: Not on file     Number of Places Lived in the Last Year: Not on file     Unstable Housing?: Not on file       FAMILY HISTORY:  Family History   Problem Relation Age of Onset    Breast Cancer Maternal Grandmother 75    Arthritis Maternal Grandmother     Thyroid disease Father     Heart Disorder Father          pacemaker    Thyroid disease Mother     Musculo-skelatal Disorder Mother         knee replacement    Heart Disorder Mother         pacemaker    Diabetes Paternal Grandmother     No Known Problems Brother     No Known Problems Brother     No Known Problems Brother     No Known Problems Daughter     No Known Problems Daughter        MEDICATIONS:    Current Outpatient Medications:     Multiple Vitamin (MULTIVITAMIN ADULT OR), Take 1 tablet by mouth daily., Disp: , Rfl:     Probiotic Product (ALIGN) 4 MG Oral Cap, Take 1 capsule by mouth daily., Disp: , Rfl:     Turmeric 500 MG Oral Tab, Take 1 tablet by mouth daily., Disp: , Rfl:     ferrous sulfate 325 (65 FE) MG Oral Tab EC, Take 1 tablet (325 mg total) by mouth daily with breakfast., Disp: , Rfl:     ALLERGIES:  Allergies[1]      Review of Systems:  Constitutional:  Denies fatigue, night sweats, hot flashes  Gastrointestinal:  denies heartburn, abdominal pain, diarrhea or constipation  Genitourinary:  denies dysuria, incontinence, abnormal vaginal discharge, vaginal itching  Skin/Breast:  Denies any breast pain, lumps, or discharge.   Neurological:  denies headaches, extremity weakness or numbness.      PHYSICAL EXAM:   Constitutional: well developed, well nourished  Head/Face: normocephalic  Neck/Thyroid: thyroid symmetric, no thyromegaly, no nodules, no adenopathy  Breast: normal without palpable masses, tenderness, asymmetry, nipple discharge, nipple retraction or skin changes  Abdomen:  soft, nontender, nondistended, no masses  Skin/Hair: no unusual rashes or bruises   Extremities: no edema, no cyanosis  Psychiatric:  Oriented to time, place, person and situation. Appropriate mood and affect    Pelvic Exam:  External Genitalia: normal appearance, hair distribution, and no lesions  Urethral Meatus:  normal in size, location, without lesions and prolapse  Bladder:  No fullness, masses or tenderness  Vagina:  Normal appearance without lesions, no abnormal  discharge  Cervix:  Normal without tenderness on motion without lesions Pap.  Uterus: normal in size, contour, position, mobility, without tenderness  Adnexa: normal without masses or tenderness  Perineum: normal  Anus: no hemorroids     Assessment & Plan:  Dennise was seen today for wellness visit, other and gyn problem.    Diagnoses and all orders for this visit:    Papanicolaou smear for cervical cancer screening  -     ThinPrep PAP with HPV Reflex Request B; Future    Encounter for screening mammogram for breast cancer  -     [facility] KAM[facility]LE 2D+3D SCREENING BILAT (CPT=77067/09217); Future                       [1]   Allergies  Allergen Reactions    Penicillins OTHER (SEE COMMENTS)     Pt unsure  Other reaction(s): Rash/Hives/Urticaria    Beeswax OTHER (SEE COMMENTS)

## 2025-02-03 LAB — HPV E6+E7 MRNA CVX QL NAA+PROBE: NEGATIVE

## 2025-02-04 LAB — LAST PAP RESULT: NORMAL

## 2025-02-06 ENCOUNTER — HOSPITAL ENCOUNTER (OUTPATIENT)
Dept: MAMMOGRAPHY | Age: 60
Discharge: HOME | End: 2025-02-06
Attending: OBSTETRICS & GYNECOLOGY
Payer: COMMERCIAL

## 2025-02-06 DIAGNOSIS — Z12.31 ENCOUNTER FOR SCREENING MAMMOGRAM FOR BREAST CANCER: ICD-10-CM

## 2025-02-06 PROCEDURE — 77067 SCR MAMMO BI INCL CAD: CPT | Performed by: OBSTETRICS & GYNECOLOGY

## 2025-02-06 PROCEDURE — 77063 BREAST TOMOSYNTHESIS BI: CPT | Performed by: OBSTETRICS & GYNECOLOGY

## 2025-04-14 ENCOUNTER — TELEPHONE (OUTPATIENT)
Dept: FAMILY MEDICINE CLINIC | Facility: CLINIC | Age: 60
End: 2025-04-14

## 2025-04-14 DIAGNOSIS — Z00.00 LABORATORY EXAMINATION ORDERED AS PART OF A COMPLETE PHYSICAL EXAMINATION: Primary | ICD-10-CM

## 2025-04-14 NOTE — TELEPHONE ENCOUNTER
Please enter lab orders for the patient's upcoming physical appointment.     Physical scheduled:   Your appointments       Date & Time Appointment Department ([place])    Apr 22, 2025 10:00 AM CDT Adult Physical with Jr Barrow PA [facility] ([facility])    PLEASE NOTE - Most insurances allow a Complete Physical once every 366 days. Please schedule accordingly.    Please arrive 15 minutes prior to your scheduled appointment. Please also bring your Insurance card, Photo ID, and your medication bottles or a list of your current medication.    If you no longer require this appointment, please contact your physician office to cancel.              On license of [facility] [address]  [address] [address]  [phone]           Preferred lab: [facility] ([facility])     The patient has been notified to complete fasting labs prior to their physical appointment.

## 2025-04-18 ENCOUNTER — LAB ENCOUNTER (OUTPATIENT)
Dept: LAB | Age: 60
End: 2025-04-18
Attending: FAMILY MEDICINE
Payer: COMMERCIAL

## 2025-04-18 DIAGNOSIS — Z00.00 LABORATORY EXAMINATION ORDERED AS PART OF A COMPLETE PHYSICAL EXAMINATION: ICD-10-CM

## 2025-04-18 LAB
ALBUMIN SERPL-MCNC: 5.1 G/DL (ref 3.2–4.8)
ALBUMIN/GLOB SERPL: 1.6 {RATIO} (ref 1–2)
ALP LIVER SERPL-CCNC: 124 U/L (ref 50–130)
ALT SERPL-CCNC: 21 U/L (ref 10–49)
ANION GAP SERPL CALC-SCNC: 12 MMOL/L (ref 0–18)
AST SERPL-CCNC: 26 U/L (ref ?–34)
BASOPHILS # BLD AUTO: 0.04 X10(3) UL (ref 0–0.2)
BASOPHILS NFR BLD AUTO: 0.7 %
BILIRUB SERPL-MCNC: 0.6 MG/DL (ref 0.2–1.1)
BUN BLD-MCNC: 14 MG/DL (ref 9–23)
CALCIUM BLD-MCNC: 10.6 MG/DL (ref 8.7–10.6)
CHLORIDE SERPL-SCNC: 105 MMOL/L (ref 98–112)
CHOLEST SERPL-MCNC: 231 MG/DL (ref ?–200)
CO2 SERPL-SCNC: 27 MMOL/L (ref 21–32)
CREAT BLD-MCNC: 0.9 MG/DL (ref 0.55–1.02)
EGFRCR SERPLBLD CKD-EPI 2021: 73 ML/MIN/1.73M2 (ref 60–?)
EOSINOPHIL # BLD AUTO: 0.33 X10(3) UL (ref 0–0.7)
EOSINOPHIL NFR BLD AUTO: 6.1 %
ERYTHROCYTE [DISTWIDTH] IN BLOOD BY AUTOMATED COUNT: 13.6 %
EST. AVERAGE GLUCOSE BLD GHB EST-MCNC: 120 MG/DL (ref 68–126)
FASTING PATIENT LIPID ANSWER: YES
FASTING STATUS PATIENT QL REPORTED: YES
GLOBULIN PLAS-MCNC: 3.1 G/DL (ref 2–3.5)
GLUCOSE BLD-MCNC: 100 MG/DL (ref 70–99)
HBA1C MFR BLD: 5.8 % (ref ?–5.7)
HCT VFR BLD AUTO: 45.8 % (ref 35–48)
HDLC SERPL-MCNC: 104 MG/DL (ref 40–59)
HGB BLD-MCNC: 15 G/DL (ref 12–16)
IMM GRANULOCYTES # BLD AUTO: 0.01 X10(3) UL (ref 0–1)
IMM GRANULOCYTES NFR BLD: 0.2 %
LDLC SERPL CALC-MCNC: 111 MG/DL (ref ?–100)
LYMPHOCYTES # BLD AUTO: 1.05 X10(3) UL (ref 1–4)
LYMPHOCYTES NFR BLD AUTO: 19.6 %
MCH RBC QN AUTO: 30.2 PG (ref 26–34)
MCHC RBC AUTO-ENTMCNC: 32.8 G/DL (ref 31–37)
MCV RBC AUTO: 92.3 FL (ref 80–100)
MONOCYTES # BLD AUTO: 0.71 X10(3) UL (ref 0.1–1)
MONOCYTES NFR BLD AUTO: 13.2 %
NEUTROPHILS # BLD AUTO: 3.23 X10 (3) UL (ref 1.5–7.7)
NEUTROPHILS # BLD AUTO: 3.23 X10(3) UL (ref 1.5–7.7)
NEUTROPHILS NFR BLD AUTO: 60.2 %
NONHDLC SERPL-MCNC: 127 MG/DL (ref ?–130)
OSMOLALITY SERPL CALC.SUM OF ELEC: 299 MOSM/KG (ref 275–295)
PLATELET # BLD AUTO: 382 10(3)UL (ref 150–450)
POTASSIUM SERPL-SCNC: 4.3 MMOL/L (ref 3.5–5.1)
PROT SERPL-MCNC: 8.2 G/DL (ref 5.7–8.2)
RBC # BLD AUTO: 4.96 X10(6)UL (ref 3.8–5.3)
SODIUM SERPL-SCNC: 144 MMOL/L (ref 136–145)
TRIGL SERPL-MCNC: 97 MG/DL (ref 30–149)
TSI SER-ACNC: 4.27 UIU/ML (ref 0.55–4.78)
VLDLC SERPL CALC-MCNC: 17 MG/DL (ref 0–30)
WBC # BLD AUTO: 5.4 X10(3) UL (ref 4–11)

## 2025-04-18 PROCEDURE — 80061 LIPID PANEL: CPT

## 2025-04-18 PROCEDURE — 80053 COMPREHEN METABOLIC PANEL: CPT

## 2025-04-18 PROCEDURE — 85025 COMPLETE CBC W/AUTO DIFF WBC: CPT

## 2025-04-18 PROCEDURE — 84443 ASSAY THYROID STIM HORMONE: CPT

## 2025-04-18 PROCEDURE — 83036 HEMOGLOBIN GLYCOSYLATED A1C: CPT

## 2025-04-18 PROCEDURE — 36415 COLL VENOUS BLD VENIPUNCTURE: CPT

## 2025-04-22 ENCOUNTER — OFFICE VISIT (OUTPATIENT)
Dept: FAMILY MEDICINE CLINIC | Facility: CLINIC | Age: 60
End: 2025-04-22
Payer: COMMERCIAL

## 2025-04-22 VITALS
WEIGHT: 155 LBS | HEIGHT: 60 IN | BODY MASS INDEX: 30.43 KG/M2 | RESPIRATION RATE: 16 BRPM | HEART RATE: 80 BPM | OXYGEN SATURATION: 98 % | DIASTOLIC BLOOD PRESSURE: 70 MMHG | TEMPERATURE: 98 F | SYSTOLIC BLOOD PRESSURE: 128 MMHG

## 2025-04-22 DIAGNOSIS — Z00.00 WELL ADULT EXAM: Primary | ICD-10-CM

## 2025-04-22 DIAGNOSIS — F51.01 PRIMARY INSOMNIA: ICD-10-CM

## 2025-04-22 PROCEDURE — 99396 PREV VISIT EST AGE 40-64: CPT | Performed by: PHYSICIAN ASSISTANT

## 2025-04-22 NOTE — PATIENT INSTRUCTIONS
Magnesium glycinate- 30 min before bed. Aim for 200-400 mg.  OK for mag threnonate     LoseIt or MyFitnessPal  Protein 100 g  Fiber 25 g  Ground flax

## 2025-04-22 NOTE — PROGRESS NOTES
The following individual(s) verbally consented to be recorded using ambient AI listening technology and understand that they can each withdraw their consent to this listening technology at any point by asking the clinician to turn off or pause the recording:    Patient name: Dennise Tadeolass

## 2025-04-24 NOTE — PROGRESS NOTES
DATE OF EXAM  4/22/2025    CHIEF COMPLAINT/REASON FOR VISIT  Annual exam.    HISTORY OF PRESENT ILLNESS  Dennise Schmitz presents today for routine annual physical examination.     History of Present Illness  Dennise Schmitz is a 59 year old female who presents with postmenopausal symptoms, including sleep disturbances and weight gain.    She experiences significant sleep disturbances, characterized by difficulty falling asleep and staying asleep. She often feels tired but is unable to fall asleep, frequently waking up in the middle of the night and struggling to return to sleep. She has tried various over-the-counter sleep aids such as Tylenol PM, Advil PM, and Unisom, but none have been effective. Her sleep issues are exacerbated by her dog's early morning needs.    She is experiencing postmenopausal symptoms, including hot flashes and weight gain. Despite maintaining her usual diet and exercise routine, she is frustrated with persistent weight gain. She has a history of foot surgery in October, which led to a period of reduced mobility, but she has since resumed walking and light exercise. The weight gain has been stable since January but is increased compared to the previous year.    She describes muscle discomfort, particularly in the legs, which she attributes to nerve issues following her foot surgery. She reports pain that wakes her up at night and describes it as a sensation that travels up her leg. She has a history of a torn ligament and tendon in her foot, which required surgery.    Her diet includes yogurt and fruit for breakfast, a salad or a small portion of meat and cheese for lunch, and a protein with rice and vegetables for dinner. She avoids small seeds due to a history of diverticulitis.        Medications Ordered Prior to Encounter[1]    Allergies:  Penicillins and Beeswax     Problem List[2]    Past Surgical History[3]    Social Hx on file[4]    Family History[5]    Health  maintenance:  Health Maintenance   Topic Date Due    Pneumococcal Vaccine: 50+ Years (1 of 1 - PCV) Never done    COVID-19 Vaccine (4 - 2024-25 season) 09/01/2024    Annual Physical  04/09/2025    Influenza Vaccine (Season Ended) 10/01/2025    Mammogram  02/06/2026    Pap Smear  01/30/2028    Colorectal Cancer Screening  04/13/2031    DTaP,Tdap,and Td Vaccines (2 - Td or Tdap) 10/30/2033    Annual Depression Screening  Completed    Zoster Vaccines  Completed    Meningococcal B Vaccine  Aged Out         PHYSICAL EXAMINATION  Blood pressure 128/70, pulse 80, temperature 97.9 °F (36.6 °C), resp. rate 16, height 5' (1.524 m), weight 155 lb (70.3 kg), last menstrual period 05/06/2014, SpO2 98%, not currently breastfeeding.   Body mass index is 30.27 kg/m².    GENERAL: Well-nourished, well-developed 59 year old year old in no apparent distress.  Awake, alert, age appropriate.  SKIN:  Warm, pink, and dry.  No rashes, excoriations, open areas.  HEENT: Head is normocephalic, atraumatic.  Bilateral pupils are equal, reactive to light, and accommodation. EOMs intact. Conjunctivae and sclera are clear.  Bilateral external ears nontender to manipulation with clear canals. Bilateral TMs normal.    Hearing is grossly normal.  Nares patent with normal mucosa.   Oropharynx pink and moist without exudate or erythema.  NECK: Supple without lymphadenopathy.  CARDIOVASCULAR: Regular rate and rhythm with no murmurs, rubs, or gallops.  LUNGS: Normal effort on room air.  Clear to auscultation throughout.  ABDOMEN:  Bowel sounds present throughout. Rounded, soft, without tenderness to palpation with no organomegaly.  MUSCULOSKELETAL: Normal active range of motion in all extremities. 5/5 strength in upper and lower extremities, equal bilaterally.   NEUROLOGIC: Alert and oriented x 3.   PSYCH: Mood and affect normal.    IMPRESSION/REPORT/PLAN    1.Routine physical examination:  Patient is doing well overall. Discussed age appropriate health  topics including: regular exercise, balanced diet, sunscreen, monitoring moles, adequate calcium and vitamin D intake. Health maintenance is up to date as shown above.  Recommend complete physical exams every year.    Assessment & Plan  Insomnia  Chronic insomnia with difficulty initiating and maintaining sleep, likely exacerbated by postmenopausal symptoms and lifestyle factors. Current sleep hygiene practices, such as reading in bed and afternoon caffeine consumption, may contribute to sleep disturbances. Previous use of diphenhydramine-containing over-the-counter sleep aids may cause morning drowsiness. Magnesium glycinate may aid in sleep without causing morning drowsiness.  - Advise cessation of caffeine intake after noon.  - Recommend reading outside of bed to improve sleep association with bed.  - Advise getting out of bed if unable to fall asleep within 10-15 minutes.  - Recommend magnesium glycinate 200-400 mg 30 minutes before sleep.  - Avoid long-term use of diphenhydramine-containing products for sleep.    Foot surgery  Post-surgical period of non-weight bearing led to decreased activity. Current efforts to increase activity include walking and using a walking pad. Reports muscle discomfort along the lateral aspect of the leg, possibly related to increased activity post-surgery. Magnesium supplementation may help alleviate muscle discomfort.  - Continue physical activity as tolerated.  - Consider magnesium supplementation to address muscle discomfort.        Patient expresses understanding and agreement with the above plan.   Jr Barrow PA-C         [1]   Current Outpatient Medications on File Prior to Visit   Medication Sig Dispense Refill    Multiple Vitamin (MULTIVITAMIN ADULT OR) Take 1 tablet by mouth daily.      Probiotic Product (ALIGN) 4 MG Oral Cap Take 1 capsule by mouth daily.      Turmeric 500 MG Oral Tab Take 1 tablet by mouth daily.      ferrous sulfate 325 (65 FE) MG Oral Tab EC Take 1  tablet (325 mg total) by mouth daily with breakfast.       No current facility-administered medications on file prior to visit.   [2]   Patient Active Problem List  Diagnosis    Fibroadenoma of right breast    Family history of thyroid disease    Facial twitching    Facial weakness    Postmenopausal bleeding    CMC DJD(carpometacarpal degenerative joint disease), localized primary    HNP (herniated nucleus pulposus), cervical    Diverticulosis of colon    Internal hemorrhoids   [3]   Past Surgical History:  Procedure Laterality Date    Colonoscopy      Create eardrum opening,gen anesth  8-10 yrs    left ear, replaced 2x    Jose biopsy stereo nodule 1 site right (cpt=19081)  1/2012    benign    Other surgical history  12/30/14    cervical dissectomy C5-C6    Other surgical history  2014    rt thumb    Other surgical history  2013    foot    Upper gi endoscopy,exam  4 yrs    Us breast biopsy 1 site right (cpt=19083)  2012    BENIGN   [4]   Social History  Socioeconomic History    Marital status:    Occupational History    Occupation: Homemaker   Tobacco Use    Smoking status: Never    Smokeless tobacco: Never   Vaping Use    Vaping status: Never Used   Substance and Sexual Activity    Alcohol use: Yes     Comment: 2 x /week    Drug use: No    Sexual activity: Yes     Partners: Male   Other Topics Concern    Caffeine Concern Yes     Comment: Tea daily-4-5 cups    Exercise Yes     Comment: walks 4-5 times daily and biking    Seat Belt Yes    Special Diet No    Stress Concern No    Weight Concern No    Self-Exams No   [5]   Family History  Problem Relation Age of Onset    Breast Cancer Maternal Grandmother 75    Arthritis Maternal Grandmother     Thyroid disease Father     Heart Disorder Father         pacemaker    Thyroid disease Mother     Musculo-skelatal Disorder Mother         knee replacement    Heart Disorder Mother         pacemaker    Diabetes Paternal Grandmother     No Known Problems Brother     No Known  Problems Brother     No Known Problems Brother     No Known Problems Daughter     No Known Problems Daughter

## 2025-08-26 ENCOUNTER — OFFICE VISIT (OUTPATIENT)
Dept: FAMILY MEDICINE CLINIC | Facility: CLINIC | Age: 60
End: 2025-08-26

## 2025-08-26 VITALS
HEART RATE: 72 BPM | DIASTOLIC BLOOD PRESSURE: 70 MMHG | SYSTOLIC BLOOD PRESSURE: 110 MMHG | RESPIRATION RATE: 12 BRPM | BODY MASS INDEX: 31 KG/M2 | TEMPERATURE: 98 F | OXYGEN SATURATION: 99 % | WEIGHT: 158 LBS

## 2025-08-26 DIAGNOSIS — E06.3 HASHIMOTO'S DISEASE: ICD-10-CM

## 2025-08-26 DIAGNOSIS — M19.071 ARTHRITIS OF BOTH FEET: ICD-10-CM

## 2025-08-26 DIAGNOSIS — M84.376D STRESS FRACTURE OF FOOT WITH ROUTINE HEALING, UNSPECIFIED LATERALITY, SUBSEQUENT ENCOUNTER: Primary | ICD-10-CM

## 2025-08-26 DIAGNOSIS — Z78.0 POSTMENOPAUSE: ICD-10-CM

## 2025-08-26 DIAGNOSIS — M19.072 ARTHRITIS OF BOTH FEET: ICD-10-CM

## 2025-08-26 PROCEDURE — 99214 OFFICE O/P EST MOD 30 MIN: CPT | Performed by: PHYSICIAN ASSISTANT

## (undated) DIAGNOSIS — Z00.00 LABORATORY EXAMINATION ORDERED AS PART OF A COMPLETE PHYSICAL EXAMINATION: Primary | ICD-10-CM

## (undated) DIAGNOSIS — E55.9 VITAMIN D DEFICIENCY: ICD-10-CM

## (undated) NOTE — LETTER
AUTHORIZATION FOR SURGICAL OPERATION OR OTHER PROCEDURE    1.  I hereby [facility] Dr. Adrianne Trinh, [facility] assigned to my case to perform the following operation and/or procedure at the [facility]:    ________________________________ Time:  ________ A. M.  P.M.        Patient Name:  ______________________________________________________  (please print)      Patient signature:  ___________________________________________________             Relationship to Patient:

## (undated) NOTE — LETTER
03/26/21    Dear Dr. Mic Vega MD       I saw your patient, David Liang for an initial visit. Please see my H&P note enclosed below. Let me know if you have any questions.     Thank you  Anastacio Huang MD, Neurology  [facility] I confusion or loss of awareness or speech arrest.      In the past several months, she also notes later in the afternoon she has headaches, mainly in the front of the head and improved when lying still.      Otherwise, patient denies any recent weight change Allergies:    Penicillins             OTHER (SEE COMMENTS)    Comment:Pt unsure   Current Meds:  Current Outpatient Medications   Medication Sig Dispense Refill   • levetiracetam 250 MG Oral Tab Take 1 tablet (250 mg total) by mouth 2 (two) times daily. Supple; full range of motion; no carotid bruits    Mental status:  Alert and oriented to time, place, person, and situation  Speech: fluent  Language: normal naming, repetition, and comprehension  Memory: normal  Attention/concentration: normal    [place] and   cerebellum are normal in signal intensity.       There is no restricted diffusion or intracranial hemorrhage. 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  Otherwise, there is no abnormal   parenchymal - 33.2 pg 30.4   MCHC      31.0 - 37.0 g/dL 32.3   Platelet Count      867 - 450 10:3/uL 342   RDW      11.5 - 16.0 % 13.2   MEAN PLATELET VOLUME      7.0 - 11.5 fL 10.5   Neutrophils Absolute      1.30 - 6.70 10ˆ3/µL 4.50   Lymphocytes Absolute      0.90 Prior MRI brain showed some T2/FLAIR white matter changes and given facial weakness and reported new onset headaches, will plan for repeat MRI brain to evaluate for secondary pathology.     Otherwise, in terms of treatment, she has been tried on baclofen as

## (undated) NOTE — LETTER
[facility] Department of OB/GYN  After Care Instructions for Endometrial Biopsy      Biopsy Results   You will receive a phone call with your biopsy results in 7 business days. If you have not received your results in 7 days, please contact our office. The results of your biopsy will determine if further treatment will be necessary. Bleeding   You may have some light bleeding or blackish clumpy discharge for several days after your biopsy. Restrictions    You should avoid intercourse or tampon use for 1 day after your biopsy. Pain    You may experience mild menstrual cramping after your biopsy. You may use Ibuprofen, Aleve or Tylenol to relieve your discomfort. If you experience severe or persistent pain contact our office. If you have any additional questions, please call us at [phone].

## (undated) NOTE — LETTER
Dennise Schmitz, :1965    CONSENT FOR PROCEDURE/SEDATION    1. I authorize the performance upon Jay Bence  the following: Endometrial biopsy procedure    2.  I authorize Dr. Yunior Almendarez MD (and whomever is designated as the doctor’s assi Relationship to patient: ____________________________________________    Witness: _________________________________________ Date:___________     Physician Signature: _______________________________ Date:___________